# Patient Record
Sex: MALE | Race: BLACK OR AFRICAN AMERICAN | NOT HISPANIC OR LATINO | URBAN - METROPOLITAN AREA
[De-identification: names, ages, dates, MRNs, and addresses within clinical notes are randomized per-mention and may not be internally consistent; named-entity substitution may affect disease eponyms.]

---

## 2021-03-11 ENCOUNTER — OFFICE VISIT (OUTPATIENT)
Dept: FAMILY MEDICINE | Facility: CLINIC | Age: 51
End: 2021-03-11
Payer: COMMERCIAL

## 2021-03-11 VITALS
HEART RATE: 71 BPM | HEIGHT: 67 IN | DIASTOLIC BLOOD PRESSURE: 90 MMHG | BODY MASS INDEX: 27.47 KG/M2 | WEIGHT: 175 LBS | TEMPERATURE: 97.4 F | SYSTOLIC BLOOD PRESSURE: 132 MMHG | OXYGEN SATURATION: 95 %

## 2021-03-11 DIAGNOSIS — Z13.6 CARDIOVASCULAR SCREENING; LDL GOAL LESS THAN 130: ICD-10-CM

## 2021-03-11 DIAGNOSIS — K64.9 HEMORRHOIDS, UNSPECIFIED HEMORRHOID TYPE: ICD-10-CM

## 2021-03-11 DIAGNOSIS — K21.9 GASTROESOPHAGEAL REFLUX DISEASE WITHOUT ESOPHAGITIS: ICD-10-CM

## 2021-03-11 DIAGNOSIS — R07.89 ATYPICAL CHEST PAIN: Primary | ICD-10-CM

## 2021-03-11 DIAGNOSIS — I10 STAGE 1 HYPERTENSION: ICD-10-CM

## 2021-03-11 LAB
ANION GAP SERPL CALCULATED.3IONS-SCNC: 4 MMOL/L (ref 3–14)
BUN SERPL-MCNC: 13 MG/DL (ref 7–30)
CALCIUM SERPL-MCNC: 9.3 MG/DL (ref 8.5–10.1)
CHLORIDE SERPL-SCNC: 103 MMOL/L (ref 94–109)
CHOLEST SERPL-MCNC: 156 MG/DL
CO2 SERPL-SCNC: 30 MMOL/L (ref 20–32)
CREAT SERPL-MCNC: 0.97 MG/DL (ref 0.66–1.25)
CREAT UR-MCNC: 193 MG/DL
GFR SERPL CREATININE-BSD FRML MDRD: 90 ML/MIN/{1.73_M2}
GLUCOSE SERPL-MCNC: 79 MG/DL (ref 70–99)
HDLC SERPL-MCNC: 29 MG/DL
LDLC SERPL CALC-MCNC: 107 MG/DL
MICROALBUMIN UR-MCNC: 14 MG/L
MICROALBUMIN/CREAT UR: 7.31 MG/G CR (ref 0–17)
NONHDLC SERPL-MCNC: 127 MG/DL
POTASSIUM SERPL-SCNC: 4.1 MMOL/L (ref 3.4–5.3)
SODIUM SERPL-SCNC: 137 MMOL/L (ref 133–144)
TRIGL SERPL-MCNC: 99 MG/DL

## 2021-03-11 PROCEDURE — 36415 COLL VENOUS BLD VENIPUNCTURE: CPT | Performed by: INTERNAL MEDICINE

## 2021-03-11 PROCEDURE — 80048 BASIC METABOLIC PNL TOTAL CA: CPT | Performed by: INTERNAL MEDICINE

## 2021-03-11 PROCEDURE — 93000 ELECTROCARDIOGRAM COMPLETE: CPT | Performed by: INTERNAL MEDICINE

## 2021-03-11 PROCEDURE — 99204 OFFICE O/P NEW MOD 45 MIN: CPT | Performed by: INTERNAL MEDICINE

## 2021-03-11 PROCEDURE — 82043 UR ALBUMIN QUANTITATIVE: CPT | Performed by: INTERNAL MEDICINE

## 2021-03-11 PROCEDURE — 80061 LIPID PANEL: CPT | Performed by: INTERNAL MEDICINE

## 2021-03-11 RX ORDER — LANSOPRAZOLE 30 MG/1
30 CAPSULE, DELAYED RELEASE ORAL
Qty: 30 CAPSULE | Refills: 11 | Status: SHIPPED | OUTPATIENT
Start: 2021-03-11 | End: 2024-07-26

## 2021-03-11 SDOH — HEALTH STABILITY: MENTAL HEALTH: HOW MANY STANDARD DRINKS CONTAINING ALCOHOL DO YOU HAVE ON A TYPICAL DAY?: NOT ASKED

## 2021-03-11 SDOH — HEALTH STABILITY: MENTAL HEALTH: HOW OFTEN DO YOU HAVE A DRINK CONTAINING ALCOHOL?: NEVER

## 2021-03-11 SDOH — HEALTH STABILITY: MENTAL HEALTH: HOW OFTEN DO YOU HAVE 6 OR MORE DRINKS ON ONE OCCASION?: NEVER

## 2021-03-11 ASSESSMENT — MIFFLIN-ST. JEOR: SCORE: 1612.42

## 2021-03-11 NOTE — PROGRESS NOTES
Evelyn Leslie is a 50 year old who presents for the following health issues     Chest Pain  Onset/Duration: 1 year  Description:   Location: left side  Character: burning and heavy  Radiation: no  Duration: varies from minutes to hours/intermittent   Intensity: moderate  Progression of Symptoms: same  Accompanying Signs & Symptoms:  Shortness of breath: no  Sweating: no  Nausea/vomiting: no  Lightheadedness: no  Palpitations: no  Fever/Chills: no  Cough: no           Heartburn: YES  History:   Family history of heart disease: no  Tobacco use: no  Previous similar symptoms: no   Precipitating factors: While driving last year. This year, it occurs while eating. Patient states that he gained weight, about 20-25 pounds. He also feels tired when he wakes up and snores heavily, leading to difficulty sleeping (2-3 hours sleep latency).  Worse with exertion: no-only after eating  Worse with deep breaths: no           Related to eating: YES- after           Better with burping: no  Alleviating factors: None,  Therapies tried and outcome: none.  Family hx is significant for hypertension (father and siblings) and stroke (father, at age 75).      Review of Systems   CONSTITUTIONAL: NEGATIVE for fever, chills, change in weight  INTEGUMENTARY/SKIN: NEGATIVE for worrisome rashes, moles or lesions  EYES: NEGATIVE for vision changes or irritation  ENT/MOUTH: NEGATIVE for ear, mouth and throat problems  RESP: NEGATIVE for significant cough or SOB  CV: NEGATIVE for diaphoresis, dyspnea on exertion, irregular heart beat, lower extremity edema, orthopnea, palpitations and paroxysmal nocturnal dyspnea  GI: NEGATIVE for nausea, abdominal pain, heartburn, or change in bowel habits  : NEGATIVE for frequency, dysuria, or hematuria  MUSCULOSKELETAL: NEGATIVE for significant arthralgias or myalgia  NEURO: NEGATIVE for weakness, dizziness or paresthesias  ENDOCRINE: NEGATIVE for temperature intolerance, skin/hair changes  HEME:  "NEGATIVE for bleeding problems  PSYCHIATRIC: NEGATIVE for changes in mood or affect      Objective    BP (!) 132/90 (BP Location: Left arm, Patient Position: Supine, Cuff Size: Adult Regular)   Pulse 71   Temp 97.4  F (36.3  C) (Tympanic)   Ht 1.702 m (5' 7\")   Wt 79.4 kg (175 lb)   SpO2 95%   BMI 27.41 kg/m    Body mass index is 27.41 kg/m .  Physical Exam   GENERAL: healthy, alert and no distress  EYES: Eyes grossly normal to inspection and EOMI  HENT: normal cephalic/atraumatic and oral mucous membranes moist  NECK: no adenopathy and thyroid normal to palpation  RESP: lungs clear to auscultation - no rales, rhonchi or wheezes  CV: regular rate and rhythm, normal S1 S2, no S3 or S4, no murmur, click or rub, no peripheral edema and peripheral pulses strong  ABDOMEN: soft, nontender, no hepatosplenomegaly, no masses and bowel sounds normal  RECTAL Deferred.  MS: no gross musculoskeletal defects noted, no edema  NEURO: Normal strength and tone, mentation intact and speech normal  PSYCH: mentation appears normal, affect normal/bright    ASSESSMENT/PLAN  1. Atypical chest pain  Non-cardiac since EKG shows normal sinus rhythm.  - EKG 12-lead complete w/read - Clinics    2. Stage 1 hypertension  Patient prefers conservative measures and dfer medical treatment.  - Basic metabolic panel  (Ca, Cl, CO2, Creat, Gluc, K, Na, BUN)  - Albumin Random Urine Quantitative with Creat Ratio    3. Gastroesophageal reflux disease without esophagitis  Most probable cause of patient's noncardiac chest pain.  - LANsoprazole (PREVACID) 30 MG DR capsule; Take 1 capsule (30 mg) by mouth every morning (before breakfast)  Dispense: 30 capsule; Refill: 11    4. CARDIOVASCULAR SCREENING; LDL GOAL LESS THAN 130  Once gain, patient's progressive weight and increasing sedentary lifestyle contributed to low HDL.  Dietary changes emphasized.  - Lipid panel reflex to direct LDL Fasting    5. Hemorrhoids, unspecified hemorrhoid type  Probable " cause of painless rectal bleeding. But patient is also due for colonoscopy. Defer to next clinic visit.    Disposition: Follow-up in 3 - 6 months.    Junior Dangelo MD  Internal Medicine

## 2021-03-11 NOTE — LETTER
"2021      Mariama Larios  [address]  [place] [address]        Dear ,    Your recent laboratory tests show the followin) Comprehensive metabolic panel shows normal glucose level (not diabetic), normal electrolytes (consisting of potassium, chloride, sodium and calcium), normal hepatic/liver panel and normal renal/kidney function tests.   2) Lipid panel is mildly abnormal. Low HDL (\"good cholesterol\") indicates that you need to exercise more and increase fiber intake. High LDL or \"bad cholesterol\" is high. Based on your test, your estimated risk of heart disease/stroke is only 6.1%. Therefore, no reason to start statins such as Lipitor or even aspirin. Your atypical chest pain is most likely due to heartburn, as discussed during your visit.   3) Basic  metabolic panel shows normal glucose level (not diabetic) and normal electrolytes (consisting of potassium, chloride, sodium and calcium) and normal kidney panel.   For any questions, you may call my office at [phone].     Sincerely,       Junior Dangelo MD   Internal Medicine     Resulted Orders   Lipid panel reflex to direct LDL Fasting   Result Value Ref Range    Cholesterol 156 <200 mg/dL    Triglycerides 99 <150 mg/dL    HDL Cholesterol 29 (L) >39 mg/dL    LDL Cholesterol Calculated 107 (H) <100 mg/dL      Comment:      Above desirable:  100-129 mg/dl  Borderline High:  130-159 mg/dL  High:             160-189 mg/dL  Very high:       >189 mg/dl      Non HDL Cholesterol 127 <130 mg/dL   Basic metabolic panel  (Ca, Cl, CO2, Creat, Gluc, K, Na, BUN)   Result Value Ref Range    Sodium 137 133 - 144 mmol/L    Potassium 4.1 3.4 - 5.3 mmol/L    Chloride 103 94 - 109 mmol/L    Carbon Dioxide 30 20 - 32 mmol/L    Anion Gap 4 3 - 14 mmol/L    Glucose 79 70 - 99 mg/dL    Urea Nitrogen 13 7 - 30 mg/dL    Creatinine 0.97 0.66 - 1.25 mg/dL    GFR Estimate 90 >60 mL/min/[1.73_m2]      Comment:      Non  GFR " Calc  Starting 12/18/2018, serum creatinine based estimated GFR (eGFR) will be   calculated using the Chronic Kidney Disease Epidemiology Collaboration   (CKD-EPI) equation.      GFR Estimate If Black >90 >60 mL/min/[1.73_m2]      Comment:       GFR Calc  Starting 12/18/2018, serum creatinine based estimated GFR (eGFR) will be   calculated using the Chronic Kidney Disease Epidemiology Collaboration   (CKD-EPI) equation.      Calcium 9.3 8.5 - 10.1 mg/dL   Albumin Random Urine Quantitative with Creat Ratio   Result Value Ref Range    Creatinine Urine 193 mg/dL    Albumin Urine mg/L 14 mg/L    Albumin Urine mg/g Cr 7.31 0 - 17 mg/g Cr

## 2021-03-11 NOTE — PATIENT INSTRUCTIONS
At [facility], we strive to deliver an exceptional experience to you, every time we see you. If you receive a survey, please complete it as we do value your feedback.  If you have MyChart, you can expect to receive results automatically within 24 hours of their completion.  Your provider will send a note interpreting your results as well.   If you do not have MyChart, you should receive your results in about a week by mail.    Your care team:                            Family Medicine Internal Medicine   MD Junior San MD Shantel Branch-Fleming, MD Srinivasa Vaka, MD Katya Belousova, PATERA Rizzo, APRN CNP    Ba Bennett, MD Pediatrics   Sebastien Castillo, PATERA Giles, CNP MD Ashlyn Coello APRN CNP   MD Corinne Davis MD Deborah Mielke, MD Rocío Anderson, APRN [facility]      Clinic hours: Monday - Thursday 7 am-6 pm; Fridays 7 am-5 pm.   Urgent care: Monday - Friday 11 am-9 pm; Saturday and Sunday 9 am-5 pm.    Clinic: [phone]       [facility]: Monday - Thursday 8 am - 7 pm; Friday 8 am - 6 pm  [facility]: [phone]     Use [email or web address] for 24/7 diagnosis and treatment of dozens of conditions.

## 2024-01-31 ENCOUNTER — OFFICE VISIT (OUTPATIENT)
Dept: URGENT CARE | Facility: URGENT CARE | Age: 54
End: 2024-01-31
Payer: COMMERCIAL

## 2024-01-31 VITALS
DIASTOLIC BLOOD PRESSURE: 91 MMHG | BODY MASS INDEX: 28.51 KG/M2 | OXYGEN SATURATION: 98 % | HEART RATE: 93 BPM | RESPIRATION RATE: 16 BRPM | TEMPERATURE: 97.6 F | SYSTOLIC BLOOD PRESSURE: 152 MMHG | WEIGHT: 182 LBS

## 2024-01-31 DIAGNOSIS — I10 STAGE 1 HYPERTENSION: ICD-10-CM

## 2024-01-31 DIAGNOSIS — M72.2 PLANTAR FASCIITIS OF RIGHT FOOT: Primary | ICD-10-CM

## 2024-01-31 PROCEDURE — 99203 OFFICE O/P NEW LOW 30 MIN: CPT | Performed by: PHYSICIAN ASSISTANT

## 2024-01-31 ASSESSMENT — PAIN SCALES - GENERAL: PAINLEVEL: SEVERE PAIN (7)

## 2024-01-31 NOTE — PROGRESS NOTES
Chief Complaint   Patient presents with    Musculoskeletal Problem     Pain on right heel for about 3 weeks. Hurt when walking, no injury.       ASSESSMENT/PLAN:  Mariama was seen today for musculoskeletal problem.    Diagnoses and all orders for this visit:    Plantar fasciitis of right foot  -     Orthopedic  Referral; Future    Stage 1 hypertension  -     Primary Care Referral; Future    Stretch before any first step after rest, use of NSAIDs for inflammation, night splints and freezing a water bottle and rolling it out with the affected foot to improve symptoms and healing.  Follow-up with Ortho if not improving over the next 3 to 4 weeks    Patient wanting to schedule an annual exam with his PCP.  They can go over his high blood pressure and other chronic medical conditions at that time.  Referral placed    Hayden Thao PA-C      SUBJECTIVE:  Mariama is a 53 year old male who presents to urgent care with 3 weeks of pain in the right heel.  It is worse when he gets out of bed in the morning or if he rests for a while and takes a step.  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  Does seem to get better a little as he continues to walk.  Feels otherwise well.  No acute injuries or trauma.    ROS: Pertinent ROS neg other than the symptoms noted above in the HPI.     OBJECTIVE:  BP (!) 152/91 (BP Location: Left arm, Patient Position: Sitting, Cuff Size: Adult Large)   Pulse 93   Temp 97.6  F (36.4  C) (Tympanic)   Resp 16   Wt 82.6 kg (182 lb)   SpO2 98%   BMI 28.51 kg/m     GENERAL: alert and no distress  MS: no gross musculoskeletal defects noted, no edema, tender at the calcaneal insertion of the plantar fascia.  No other bony tenderness.  Full range of motion.  SKIN: no suspicious lesions or rashes  NEURO: Normal strength and tone, mentation intact and speech normal    DIAGNOSTICS    No results found for any visits on 01/31/24.      Current Outpatient Medications   Medication    LANsoprazole (PREVACID) 30 MG DR capsule     No current facility-administered medications for this visit.      Patient Active Problem List   Diagnosis    Gastroesophageal reflux disease without esophagitis    Stage 1 hypertension    Hemorrhoids, unspecified hemorrhoid type    CARDIOVASCULAR SCREENING; LDL GOAL LESS THAN 130      No past medical history on file.  No past surgical history on file.  Family History   Problem Relation Age of Onset    Hypertension Father     Other - See Comments Father         stroke at 75    Hypertension Brother     Hypertension Brother      Social History     Tobacco Use    Smoking status: Never    Smokeless tobacco: Never   Substance Use Topics    Alcohol use: Never              The plan of care was discussed with the patient. They understand and agree with the course of treatment prescribed. A printed summary was given including instructions and medications.  The use of Dragon/VideoIQ dictation services may have been used to construct the content in this note; any grammatical or spelling errors are non-intentional. Please contact the author of this note directly if you are in need of any clarification.

## 2024-01-31 NOTE — PATIENT INSTRUCTIONS
Stretch before any first step after rest, use of NSAIDs for inflammation, night splints and freezing a water bottle and rolling it out with the affected foot to improve symptoms and healing.

## 2024-04-25 ENCOUNTER — OFFICE VISIT (OUTPATIENT)
Dept: URGENT CARE | Facility: URGENT CARE | Age: 54
End: 2024-04-25
Payer: COMMERCIAL

## 2024-04-25 VITALS
TEMPERATURE: 96.8 F | WEIGHT: 182.3 LBS | OXYGEN SATURATION: 98 % | BODY MASS INDEX: 28.55 KG/M2 | HEART RATE: 94 BPM | SYSTOLIC BLOOD PRESSURE: 139 MMHG | DIASTOLIC BLOOD PRESSURE: 96 MMHG

## 2024-04-25 DIAGNOSIS — M72.2 PLANTAR FASCIITIS OF RIGHT FOOT: Primary | ICD-10-CM

## 2024-04-25 PROCEDURE — 99213 OFFICE O/P EST LOW 20 MIN: CPT | Performed by: PHYSICIAN ASSISTANT

## 2024-04-25 RX ORDER — NAPROXEN 500 MG/1
500 TABLET ORAL 2 TIMES DAILY PRN
Qty: 30 TABLET | Refills: 0 | Status: SHIPPED | OUTPATIENT
Start: 2024-04-25 | End: 2024-05-10

## 2024-04-25 ASSESSMENT — ENCOUNTER SYMPTOMS
NECK PAIN: 0
MYALGIAS: 1
JOINT SWELLING: 0
BACK PAIN: 0
PALPITATIONS: 0
FATIGUE: 0
FEVER: 0
COLOR CHANGE: 0
CHILLS: 0
CARDIOVASCULAR NEGATIVE: 1
ARTHRALGIAS: 1
NUMBNESS: 0
NECK STIFFNESS: 0
WOUND: 0

## 2024-04-25 NOTE — PROGRESS NOTES
Subjective   Mariama is a 53 year old, presenting for the following health issues:  Urgent Care and Leg Pain (Right leg pain-Couple months ago told it was a tear of the tissue, the pain refused to go)    HPI   Musculoskeletal problem/pain  Onset/Duration: 2months  Description  Location: R foot  Joint Swelling: no  Redness: no  Pain: YES  Warmth: no  Intensity:  moderate  Progression of Symptoms:  same  Accompanying signs and symptoms:   Fevers: no  Numbness/tingling/weakness: no  History  Trauma to the area: no but reports pain under his foot and heel region.  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   Recent illness:  no  Previous similar problem: no  Previous evaluation:  no  Precipitating or alleviating factors:  Aggravating factors include: overuse  Therapies tried and outcome: rest/inactivity, acetaminophen, Ibuprofen, massage ball with minimal relief    Patient Active Problem List   Diagnosis    Gastroesophageal reflux disease without esophagitis    Stage 1 hypertension    Hemorrhoids, unspecified hemorrhoid type    CARDIOVASCULAR SCREENING; LDL GOAL LESS THAN 130     Current Outpatient Medications   Medication Sig Dispense Refill    LANsoprazole (PREVACID) 30 MG DR capsule Take 1 capsule (30 mg) by mouth every morning (before breakfast) (Patient not taking: Reported on 1/31/2024) 30 capsule 11     No current facility-administered medications for this visit.      No Known Allergies    Review of Systems   Constitutional:  Negative for chills, fatigue and fever.   Cardiovascular: Negative.  Negative for chest pain, palpitations and leg swelling.   Musculoskeletal:  Positive for arthralgias and myalgias. Negative for back pain, gait problem, joint swelling, neck pain and neck stiffness.   Skin:  Negative for color change, pallor, rash and wound.   Neurological:  Negative for numbness.   All other systems reviewed and are negative.          Objective     BP (!) 139/96 (BP Location: Left arm, Patient Position: Sitting, Cuff Size: Adult Regular)   Pulse 94   Temp 96.8  F (36  C) (Tympanic)   Wt 82.7 kg (182 lb 4.8 oz)   SpO2 98%   BMI 28.55 kg/m    Body mass index is 28.55 kg/m .  Physical Exam  Vitals and nursing note reviewed.   Constitutional:       General: He is not in acute distress.     Appearance: Normal appearance. He is normal weight. He is not ill-appearing.   Musculoskeletal:      Right foot: Normal range of motion and normal capillary refill. Tenderness (heel region) present. No swelling, deformity, bony tenderness or crepitus. Normal pulse.      Left foot: Normal. Normal capillary refill. Normal pulse.   Skin:     General: Skin is warm.      Capillary Refill: Capillary refill takes less than 2 seconds.   Neurological:      Mental Status: He is alert and oriented to person, place, and time.   Psychiatric:         Mood and Affect: Mood normal.         Behavior: Behavior normal.         Thought Content: Thought content normal.         Judgment: Judgment normal.             Assessment/Plan:  Plantar fasciitis of right foot:   He has failed conservative treatment. Recommend RICE, wear comfortable shoes and will give naproxen prn pain.  Will also send to orthopedics for further evaluation and management.  Recheck in clinic if symptoms worsen or if symptoms do not improve.   -     Orthopedic  Referral  -     naproxen (NAPROSYN) 500 MG tablet; Take 1 tablet (500 mg) by mouth 2 times daily as needed for moderate pain (with food)        Ann-Marie Curran PA-C

## 2024-04-25 NOTE — LETTER
April 25, 2024      Mariama Larios  [address] [facility] [address]        To Whom It May Concern:    Mariama Larios was seen in urgent care clinic today and was unable to work today due to his symptoms.  Please feel free to contact me via phone if you have any questions or concerns.        Sincerely,      Ann-Marie Curran PA-C

## 2024-05-15 ENCOUNTER — ANCILLARY PROCEDURE (OUTPATIENT)
Dept: GENERAL RADIOLOGY | Facility: CLINIC | Age: 54
End: 2024-05-15
Attending: FAMILY MEDICINE
Payer: COMMERCIAL

## 2024-05-15 ENCOUNTER — OFFICE VISIT (OUTPATIENT)
Dept: ORTHOPEDICS | Facility: CLINIC | Age: 54
End: 2024-05-15
Payer: COMMERCIAL

## 2024-05-15 VITALS
BODY MASS INDEX: 28.56 KG/M2 | SYSTOLIC BLOOD PRESSURE: 138 MMHG | HEIGHT: 67 IN | DIASTOLIC BLOOD PRESSURE: 88 MMHG | WEIGHT: 182 LBS

## 2024-05-15 DIAGNOSIS — M25.571 PAIN IN JOINT INVOLVING RIGHT ANKLE AND FOOT: ICD-10-CM

## 2024-05-15 DIAGNOSIS — M25.571 PAIN IN JOINT INVOLVING RIGHT ANKLE AND FOOT: Primary | ICD-10-CM

## 2024-05-15 DIAGNOSIS — M72.2 PLANTAR FASCIITIS OF RIGHT FOOT: ICD-10-CM

## 2024-05-15 PROCEDURE — 73630 X-RAY EXAM OF FOOT: CPT | Mod: TC | Performed by: RADIOLOGY

## 2024-05-15 PROCEDURE — 99204 OFFICE O/P NEW MOD 45 MIN: CPT | Performed by: FAMILY MEDICINE

## 2024-05-15 NOTE — LETTER
"    5/15/2024         RE: Mariama Larios  [address] [place] [address]        Dear Colleague,    Thank you for referring your patient, Mariama Larios, to the [facility]. Please see a copy of my visit note below.    Mariama Larios  :  1970  DOS: 5/15/2024  MRN: [id number]    Sports Medicine Clinic Visit    PCP: Junior Dangelo    Mariama Larios is a 53 year old male who is seen in consultation at the request of  Ann-Marie Curran PA-C presenting with right foot pain.    Injury: Gradual onset of pain over the past 4+ months.  Pain located over right plantar heel, medial arch, radiating to dorsal forefoot.  Additional Features:  Positive: swelling and antalgic gait.  Symptoms are better with Ice and Aleve.  Symptoms are worse with: initial steps in AM & after prolonged sitting, prolonged walking/standing.  Other evaluation and/or treatments so far consists of: Ice, Aleve, Rest, and self massage, stretching.  Prior imaging: No recent imaging completed.  Prior History of related problems: none    Social History: currently employed as [employer]     Review of Systems  Musculoskeletal: as above  Remainder of review of systems is negative including constitutional, CV, pulmonary, GI, Skin and Neurologic except as noted in HPI or medical history.    No past medical history on file.  No past surgical history on file.  Family History   Problem Relation Age of Onset     Hypertension Father      Other - See Comments Father         stroke at 75     Hypertension Brother      Hypertension Brother        Objective  /88   Ht 1.702 m (5' 7\")   Wt 82.6 kg (182 lb)   BMI 28.51 kg/m      General: healthy, alert and in no distress    HEENT: no scleral icterus or conjunctival erythema   Skin: no suspicious lesions or rash. No jaundice.   CV: regular rhythm by palpation, 2+ distal pulses, no pedal edema    Resp: normal respiratory effort " without conversational dyspnea   Psych: normal mood and affect    Gait: antalgic, appropriate coordination and balance   Neuro: normal light touch sensory exam of the extremities. Motor strength as noted below     Right Ankle/Foot Exam:    Inspection:       No visible ecchymosis       No visible edema or effusion       Normal DP artery pulse       Normal PT artery pulse    Foot inspection:       no deformity noted    ROM:        full ROM with dorsiflexion, plantarflexion, inversion and eversion    Tender:       Most focal over the plantar fascia proximally at attachment to calcaneus       Mild 1st MTP joint TTP       Mild moderate TTP of the medial and lateral gastrocs    Non-Tender:       remainder of foot and ankle       lateral malleolus       lateral ankle ligaments       deltoid ligament       posterior edge of lateral malleolus       proximal 5th metatarsal       dorsal tibiotalar joint       tarsal navicular       medial malleolus       distal tibiofibular joint       tibialis posterior tendon, posterior to medial malleolus       peroneal tendon sheath    Skin:       well perfused       capillary refill less than 2 seconds    Special Tests:       neg (-) anterior drawer        neg (-) talar tilt        neg (-) external rotation testing     Gait:       antalgic gait    Proprioception:        deferred      Radiology:  Recent Results (from the past 744 hour(s))   XR Foot Right G/E 3 Views    Narrative    XR FOOT RIGHT G/E 3 VIEWS 5/15/2024 8:53 AM    HISTORY: Pain in joint involving right ankle and foot    COMPARISON: None.      Impression    IMPRESSION: No fracture or degenerative changes. Normal alignment.    ERICA OROZCO MD         SYSTEM ID:  [id number]       Assessment:  1. Pain in joint involving right ankle and foot    2. Plantar fasciitis of right foot        Plan:  Discussed the assessment with the patient.  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  Letter for work provided to consider alternatives in the short term   HEP provided, PT offered and available in the future if desired  XR images independently visualized and reviewed with patient today in clinic  Oral Tylenol and topical Voltaren gel reviewed as safe OTC options, reviewed safe dosing strategies  Heel cup options reviewed  No concern for stress fracture or joint pain based on exam, very mild 1st MTP joint pain related to mild DJD  Footwear options and strategies reviewed  Consider dynaflex insert or walking boot if pain worsens  Prefer to avoid CSI for this issue, could consider PRP vs percutaneous tenotomy in the future if sx worsen or do not improve  We discussed modified progressive pain-free activity as tolerated  Home handouts provided and supportive care reviewed  All questions were answered today  Contact us with additional questions or concerns  Signs and sx of concern reviewed      Bryce Lester DO, ROSA MARIA  Sports Medicine Physician  [facility]              Again, thank you for allowing me to participate in the care of your patient.        Sincerely,        Bryce Lester DO

## 2024-05-15 NOTE — PROGRESS NOTES
"Mariama Larios  :  1970  DOS: 5/15/2024  MRN: [id number]    Sports Medicine Clinic Visit    PCP: Junior Dangelo    Mariama Larios is a 53 year old male who is seen in consultation at the request of  Ann-Marie Curran PA-C presenting with right foot pain.    Injury: Gradual onset of pain over the past 4+ months.  Pain located over right plantar heel, medial arch, radiating to dorsal forefoot.  Additional Features:  Positive: swelling and antalgic gait.  Symptoms are better with Ice and Aleve.  Symptoms are worse with: initial steps in AM & after prolonged sitting, prolonged walking/standing.  Other evaluation and/or treatments so far consists of: Ice, Aleve, Rest, and self massage, stretching.  Prior imaging: No recent imaging completed.  Prior History of related problems: none    Social History: currently employed as [employer]     Review of Systems  Musculoskeletal: as above  Remainder of review of systems is negative including constitutional, CV, pulmonary, GI, Skin and Neurologic except as noted in HPI or medical history.    No past medical history on file.  No past surgical history on file.  Family History   Problem Relation Age of Onset    Hypertension Father     Other - See Comments Father         stroke at 75    Hypertension Brother     Hypertension Brother        Objective  /88   Ht 1.702 m (5' 7\")   Wt 82.6 kg (182 lb)   BMI 28.51 kg/m      General: healthy, alert and in no distress    HEENT: no scleral icterus or conjunctival erythema   Skin: no suspicious lesions or rash. No jaundice.   CV: regular rhythm by palpation, 2+ distal pulses, no pedal edema    Resp: normal respiratory effort without conversational dyspnea   Psych: normal mood and affect    Gait: antalgic, appropriate coordination and balance   Neuro: normal light touch sensory exam of the extremities. Motor strength as noted below     Right Ankle/Foot Exam:    Inspection:       No visible " ecchymosis       No visible edema or effusion       Normal DP artery pulse       Normal PT artery pulse    Foot inspection:       no deformity noted    ROM:        full ROM with dorsiflexion, plantarflexion, inversion and eversion    Tender:       Most focal over the plantar fascia proximally at attachment to calcaneus       Mild 1st MTP joint TTP       Mild moderate TTP of the medial and lateral gastrocs    Non-Tender:       remainder of foot and ankle       lateral malleolus       lateral ankle ligaments       deltoid ligament       posterior edge of lateral malleolus       proximal 5th metatarsal       dorsal tibiotalar joint       tarsal navicular       medial malleolus       distal tibiofibular joint       tibialis posterior tendon, posterior to medial malleolus       peroneal tendon sheath    Skin:       well perfused       capillary refill less than 2 seconds    Special Tests:       neg (-) anterior drawer        neg (-) talar tilt        neg (-) external rotation testing     Gait:       antalgic gait    Proprioception:        deferred      Radiology:  Recent Results (from the past 744 hour(s))   XR Foot Right G/E 3 Views    Narrative    XR FOOT RIGHT G/E 3 VIEWS 5/15/2024 8:53 AM    HISTORY: Pain in joint involving right ankle and foot    COMPARISON: None.      Impression    IMPRESSION: No fracture or degenerative changes. Normal alignment.    ERICA OROZCO MD         SYSTEM ID:  [id number]       Assessment:  1. Pain in joint involving right ankle and foot    2. Plantar fasciitis of right foot        Plan:  Discussed the assessment with the patient.  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  Letter for work provided to consider alternatives in the  short term   HEP provided, PT offered and available in the future if desired  XR images independently visualized and reviewed with patient today in clinic  Oral Tylenol and topical Voltaren gel reviewed as safe OTC options, reviewed safe dosing strategies  Heel cup options reviewed  No concern for stress fracture or joint pain based on exam, very mild 1st MTP joint pain related to mild DJD  Footwear options and strategies reviewed  Consider dynaflex insert or walking boot if pain worsens  Prefer to avoid CSI for this issue, could consider PRP vs percutaneous tenotomy in the future if sx worsen or do not improve  We discussed modified progressive pain-free activity as tolerated  Home handouts provided and supportive care reviewed  All questions were answered today  Contact us with additional questions or concerns  Signs and sx of concern reviewed      Bryce Lester DO, CAQ  Sports Medicine Physician  [facility]

## 2024-05-15 NOTE — LETTER
May 15, 2024      Mariama was seen in my office today and is under my care.  He has significant ongoing pain that requires activity and work modification.  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  Larger vehicles utilize different braking and acceleration systems that increase the stress on his condition.  Failure to adhere to these recommendations will lead to increased pain and potentially missed work.  Any absence if needed over the next 3 months due to a severe flare of pain should be considered medically excused if needed.  Updated recommendations will be provided as needed based on his progress.      Bryce Sarkar DO, CABJ  Sports Medicine Physician  [facility]

## 2024-05-16 ENCOUNTER — TELEPHONE (OUTPATIENT)
Dept: ORTHOPEDICS | Facility: CLINIC | Age: 54
End: 2024-05-16
Payer: COMMERCIAL

## 2024-05-16 NOTE — TELEPHONE ENCOUNTER
[facility] Call Center    Phone Message    May a detailed message be left on voicemail: yes     Reason for Call: Other: Patient is calling to speak to care team regarding appt yesterday and letter that he received as well      Action Taken: Other: repa    Travel Screening: Not Applicable

## 2024-05-16 NOTE — TELEPHONE ENCOUNTER
LVM for return call to discuss his workability letter and specific questions that he may have.  Please gather more specific information when patient returns phone call.    Carlos Rosenthal ATC

## 2024-05-16 NOTE — TELEPHONE ENCOUNTER
Other: pt called in regards to work note. There is a letter from employment that is needing provider to sign off. Pt will be dropping off the letter of employment today and will be back tomorrow to  the letter. Can care team reach out to pt once the letter has been signed off for pt to ?      [facility] or would you prefer to receive a phone call?:   Patient would prefer a phone call   Okay to leave a detailed message?: Yes at Work number on file:  [phone] (work)

## 2024-05-17 ENCOUNTER — TELEPHONE (OUTPATIENT)
Dept: ORTHOPEDICS | Facility: CLINIC | Age: 54
End: 2024-05-17
Payer: COMMERCIAL

## 2024-05-17 NOTE — TELEPHONE ENCOUNTER
Reason for Call:  Form, our goal is to have forms completed with 7 days, however, some forms may require a visit or additional information.    Type of letter, form or note:   Return to Work      Who is the form from?:  Employer - [employer]    Where did the form come from: Patient or family brought in       Phone number of person requesting form:   Can we leave a detailed message on this number:      Desired completion date of form: ASAP      How will form be returned?:  picked up in clinic by patient, clinical team to contact when ready    Has the patient signed a consent form for release of information (may be included with form)?  No -verbal consent in prior message    Additional comments:     Form was started and place in Provider Basket for provider review/ completion at [facility].     Carlos Rosenthal ATC

## 2024-05-17 NOTE — TELEPHONE ENCOUNTER
Patient called in wanting to speak with Dr. Lester directly.. I advised him that he was in patient care and I would reach out to his team to see if someone was available to speak with him. He will not tell me what it is in regards too. Only thing he asked about when he called in at 1st was his paperwork and if it was ready, I advised him that it was not and Dr. Lester's team has 5-7 days to complete it - when it is done they would reach out to him.       Please contact the patient back at [phone].     Thank you,   Bri Dawson - [facility]

## 2024-05-17 NOTE — TELEPHONE ENCOUNTER
FELIXM for return call to discuss that forms had been completed & placed at [facility]  for .  Copy of completed form was sent to scanning.    Carlos Rosenthal ATC

## 2024-05-17 NOTE — TELEPHONE ENCOUNTER
Patient called in wanting to speak with Dr. Lester directly.. I advised him that he was in patient care and I would reach out to his team to see if someone was available to speak with him. He will not tell me what it is in regards too. Only thing he asked about when he called in at 1st was his paperwork and if it was ready, I advised him that it was not and Dr. Lester's team has 5-7 days to complete it - when it is done they would reach out to him.      Please contact the patient back at [phone].    Thank you,   Bri Dawson - [facility]

## 2024-07-26 ENCOUNTER — OFFICE VISIT (OUTPATIENT)
Dept: FAMILY MEDICINE | Facility: CLINIC | Age: 54
End: 2024-07-26
Payer: COMMERCIAL

## 2024-07-26 VITALS
DIASTOLIC BLOOD PRESSURE: 86 MMHG | WEIGHT: 177.6 LBS | SYSTOLIC BLOOD PRESSURE: 134 MMHG | RESPIRATION RATE: 16 BRPM | HEIGHT: 67 IN | HEART RATE: 79 BPM | BODY MASS INDEX: 27.88 KG/M2 | TEMPERATURE: 98 F | OXYGEN SATURATION: 97 %

## 2024-07-26 DIAGNOSIS — Z31.69 INFERTILITY COUNSELING: ICD-10-CM

## 2024-07-26 DIAGNOSIS — Z12.11 SCREEN FOR COLON CANCER: Primary | ICD-10-CM

## 2024-07-26 DIAGNOSIS — Z00.00 ROUTINE GENERAL MEDICAL EXAMINATION AT A HEALTH CARE FACILITY: ICD-10-CM

## 2024-07-26 DIAGNOSIS — Z11.59 NEED FOR HEPATITIS C SCREENING TEST: ICD-10-CM

## 2024-07-26 DIAGNOSIS — Z11.4 SCREENING FOR HIV (HUMAN IMMUNODEFICIENCY VIRUS): ICD-10-CM

## 2024-07-26 LAB
ERYTHROCYTE [DISTWIDTH] IN BLOOD BY AUTOMATED COUNT: 14.5 % (ref 10–15)
HBA1C MFR BLD: 5.5 % (ref 0–5.6)
HCT VFR BLD AUTO: 46.6 % (ref 40–53)
HCV AB SERPL QL IA: NONREACTIVE
HGB BLD-MCNC: 15.4 G/DL (ref 13.3–17.7)
MCH RBC QN AUTO: 26.4 PG (ref 26.5–33)
MCHC RBC AUTO-ENTMCNC: 33 G/DL (ref 31.5–36.5)
MCV RBC AUTO: 80 FL (ref 78–100)
PLATELET # BLD AUTO: 279 10E3/UL (ref 150–450)
RBC # BLD AUTO: 5.83 10E6/UL (ref 4.4–5.9)
WBC # BLD AUTO: 4.2 10E3/UL (ref 4–11)

## 2024-07-26 PROCEDURE — 86803 HEPATITIS C AB TEST: CPT | Performed by: NURSE PRACTITIONER

## 2024-07-26 PROCEDURE — 80061 LIPID PANEL: CPT | Performed by: NURSE PRACTITIONER

## 2024-07-26 PROCEDURE — 87389 HIV-1 AG W/HIV-1&-2 AB AG IA: CPT | Performed by: NURSE PRACTITIONER

## 2024-07-26 PROCEDURE — 83036 HEMOGLOBIN GLYCOSYLATED A1C: CPT | Performed by: NURSE PRACTITIONER

## 2024-07-26 PROCEDURE — 91320 SARSCV2 VAC 30MCG TRS-SUC IM: CPT | Performed by: NURSE PRACTITIONER

## 2024-07-26 PROCEDURE — 90471 IMMUNIZATION ADMIN: CPT | Performed by: NURSE PRACTITIONER

## 2024-07-26 PROCEDURE — 90750 HZV VACC RECOMBINANT IM: CPT | Performed by: NURSE PRACTITIONER

## 2024-07-26 PROCEDURE — 36415 COLL VENOUS BLD VENIPUNCTURE: CPT | Performed by: NURSE PRACTITIONER

## 2024-07-26 PROCEDURE — 90480 ADMN SARSCOV2 VAC 1/ONLY CMP: CPT | Performed by: NURSE PRACTITIONER

## 2024-07-26 PROCEDURE — 80048 BASIC METABOLIC PNL TOTAL CA: CPT | Performed by: NURSE PRACTITIONER

## 2024-07-26 PROCEDURE — 85027 COMPLETE CBC AUTOMATED: CPT | Performed by: NURSE PRACTITIONER

## 2024-07-26 PROCEDURE — 99396 PREV VISIT EST AGE 40-64: CPT | Mod: 25 | Performed by: NURSE PRACTITIONER

## 2024-07-26 PROCEDURE — G0103 PSA SCREENING: HCPCS | Performed by: NURSE PRACTITIONER

## 2024-07-26 SDOH — HEALTH STABILITY: PHYSICAL HEALTH
ON AVERAGE, HOW MANY DAYS PER WEEK DO YOU ENGAGE IN MODERATE TO STRENUOUS EXERCISE (LIKE A BRISK WALK)?: PATIENT DECLINED

## 2024-07-26 SDOH — HEALTH STABILITY: PHYSICAL HEALTH: ON AVERAGE, HOW MANY MINUTES DO YOU ENGAGE IN EXERCISE AT THIS LEVEL?: 50 MIN

## 2024-07-26 ASSESSMENT — SOCIAL DETERMINANTS OF HEALTH (SDOH): HOW OFTEN DO YOU GET TOGETHER WITH FRIENDS OR RELATIVES?: MORE THAN THREE TIMES A WEEK

## 2024-07-26 NOTE — PATIENT INSTRUCTIONS
Patient Education   Preventive Care Advice   This is general advice given by our system to help you stay healthy. However, your care team may have specific advice just for you. Please talk to your care team about your preventive care needs.  Nutrition  Eat 5 or more servings of fruits and vegetables each day.  Try wheat bread, brown rice and whole grain pasta (instead of white bread, rice, and pasta).  Get enough calcium and vitamin D. Check the label on foods and aim for 100% of the RDA (recommended daily allowance).  Lifestyle  Exercise at least 150 minutes each week  (30 minutes a day, 5 days a week).  Do muscle strengthening activities 2 days a week. These help control your weight and prevent disease.  No smoking.  Wear sunscreen to prevent skin cancer.  Have a dental exam and cleaning every 6 months.  Yearly exams  See your health care team every year to talk about:  Any changes in your health.  Any medicines your care team has prescribed.  Preventive care, family planning, and ways to prevent chronic diseases.  Shots (vaccines)   HPV shots (up to age 26), if you've never had them before.  Hepatitis B shots (up to age 59), if you've never had them before.  COVID-19 shot: Get this shot when it's due.  Flu shot: Get a flu shot every year.  Tetanus shot: Get a tetanus shot every 10 years.  Pneumococcal, hepatitis A, and RSV shots: Ask your care team if you need these based on your risk.  Shingles shot (for age 50 and up)  General health tests  Diabetes screening:  Starting at age 35, Get screened for diabetes at least every 3 years.  If you are younger than age 35, ask your care team if you should be screened for diabetes.  Cholesterol test: At age 39, start having a cholesterol test every 5 years, or more often if advised.  Bone density scan (DEXA): At age 50, ask your care team if you should have this scan for osteoporosis (brittle bones).  Hepatitis C: Get tested at least once in your life.  STIs (sexually  transmitted infections)  Before age 24: Ask your care team if you should be screened for STIs.  After age 24: Get screened for STIs if you're at risk. You are at risk for STIs (including HIV) if:  You are sexually active with more than one person.  You don't use condoms every time.  You or a partner was diagnosed with a sexually transmitted infection.  If you are at risk for HIV, ask about PrEP medicine to prevent HIV.  Get tested for HIV at least once in your life, whether you are at risk for HIV or not.  Cancer screening tests  Cervical cancer screening: If you have a cervix, begin getting regular cervical cancer screening tests starting at age 21.  Breast cancer scan (mammogram): If you've ever had breasts, begin having regular mammograms starting at age 40. This is a scan to check for breast cancer.  Colon cancer screening: It is important to start screening for colon cancer at age 45.  Have a colonoscopy test every 10 years (or more often if you're at risk) Or, ask your provider about stool tests like a FIT test every year or Cologuard test every 3 years.  To learn more about your testing options, visit:   .  For help making a decision, visit:   https://bit.ly/fw35772.  Prostate cancer screening test: If you have a prostate, ask your care team if a prostate cancer screening test (PSA) at age 55 is right for you.  Lung cancer screening: If you are a current or former smoker ages 50 to 80, ask your care team if ongoing lung cancer screenings are right for you.  For informational purposes only. Not to replace the advice of your health care provider. Copyright   2023 [facility]. All rights reserved. Clinically reviewed by the [facility] Transitions Program. [employer] 985378 - REV 01/24.  Safer Sex: Care Instructions  Overview  Safer sex is a way to reduce your risk of getting a sexually transmitted infection (STI). It can also help prevent pregnancy.  Several products can help you practice  safer sex and reduce your chance of STIs. One of the best is a condom. There are internal and external condoms. You can use a special rubber sheet (dental dam) for protection during oral sex. Disposable gloves can keep your hands from touching blood, semen, or other body fluids that can carry infections.  Remember that birth control methods such as diaphragms, IUDs, foams, and birth control pills do not stop you from getting STIs.  Follow-up care is a key part of your treatment and safety. Be sure to make and go to all appointments, and call your doctor if you are having problems. It's also a good idea to know your test results and keep a list of the medicines you take.  How can you care for yourself at home?  Think about getting vaccinated to help prevent hepatitis A, hepatitis B, and human papillomavirus (HPV). They can be spread through sex.  Use a condom every time you have sex. Use an external condom, which goes on the penis. Or use an internal condom, which goes into the vagina or anus.  Make sure you use the right size external condom. A condom that's too small can break easily. A condom that's too big can slip off during sex.  Use a new condom each time you have sex. Be careful not to poke a hole in the condom when you open the wrapper.  Don't use an internal condom and an external condom at the same time.  Never use petroleum jelly (such as Vaseline), grease, hand lotion, baby oil, or anything with oil in it. These products can make holes in the condom.  After intercourse, hold the edge of the condom as you remove it. This will help keep semen from spilling out of the condom.  Do not have sex with anyone who has symptoms of an STI, such as sores on the genitals or mouth.  Do not drink a lot of alcohol or use drugs before sex.  Limit your sex partners. Sex with one partner who has sex only with you can reduce your risk of getting an STI.  Don't share sex toys. But if you do share them, use a condom and clean  "the sex toys between each use.  Talk to any partners before you have sex. Talk about what you feel comfortable with and whether you have any boundaries with sex. And find out if your partner or partners may be at risk for any STI. Keep in mind that a person may be able to spread an STI even if they do not have symptoms. You and any partners may want to get tested for STIs.  Where can you learn more?  Go to https://www.[employer].net/patiented  Enter B608 in the search box to learn more about \"Safer Sex: Care Instructions.\"  Current as of: November 27, 2023               Content Version: 14.0    2659-9112 [employer].   Care instructions adapted under license by your healthcare professional. If you have questions about a medical condition or this instruction, always ask your healthcare professional. [employer] disclaims any warranty or liability for your use of this information.         "

## 2024-07-26 NOTE — PROGRESS NOTES
"Preventive Care Visit  [facility]  ARTEMIO Daley CNP, Family Medicine  Jul 26, 2024      Assessment & Plan     Routine general medical examination at a health care facility  Doing well. Would like to discuss infertility issues.   - CBC with platelets; Future  - Basic metabolic panel  (Ca, Cl, CO2, Creat, Gluc, K, Na, BUN); Future  - Lipid panel reflex to direct LDL Non-fasting; Future  - Hemoglobin A1c; Future  - PSA, screen; Future  - CBC with platelets  - Basic metabolic panel  (Ca, Cl, CO2, Creat, Gluc, K, Na, BUN)  - Lipid panel reflex to direct LDL Non-fasting  - Hemoglobin A1c  - PSA, screen    Screen for colon cancer  - Colonoscopy Screening  Referral; Future    Screening for HIV (human immunodeficiency virus)  - HIV Antigen Antibody Combo; Future    Need for hepatitis C screening test  - Hepatitis C Screen Reflex to HCV RNA Quant and Genotype; Future    Infertility counseling  Reports inability to conceive for more than 12 months of frequent unprotected intercourse. No history of STDs,  chemo or radiotherapy.Would like a semen analysis completed.   - Discussed lifestyle modifications like exercise, decrease alcohol intake, decrease caffeine intake.  - Discussed stress reduction coping mechanisms  Avoid water-based lubricants as this may decrease sperm motility  - Adult Urology  Referral; Future    Patient has been advised of split billing requirements and indicates understanding: Yes    BMI  Estimated body mass index is 27.82 kg/m  as calculated from the following:    Height as of this encounter: 1.702 m (5' 7\").    Weight as of this encounter: 80.6 kg (177 lb 9.6 oz).       Counseling  Appropriate preventive services were addressed with this patient via screening, questionnaire, or discussion as appropriate for fall prevention, nutrition, physical activity, Tobacco-use cessation, weight loss and cognition.  Checklist reviewing preventive services " available has been given to the patient.  Reviewed patient's diet, addressing concerns and/or questions.         Subjective   Heather is a 53 year old, presenting for the following:  Physical        7/26/2024     1:53 PM   Additional Questions   Roomed by Ayaka   Accompanied by self         7/26/2024     1:53 PM   Patient Reported Additional Medications   Patient reports taking the following new medications No        Health Care Directive  Patient does not have a Health Care Directive or Living Will: Discussed advance care planning with patient; however, patient declined at this time.    HPI  Presents for a preventative visit and to discuss fertility            7/26/2024   General Health   How would you rate your overall physical health? (!) DECLINE   Feel stress (tense, anxious, or unable to sleep) Not at all            7/26/2024   Nutrition   Three or more servings of calcium each day? (!) I DON'T KNOW   Diet: Regular (no restrictions)   How many servings of fruit and vegetables per day? (!) I DON'T KNOW   How many sweetened beverages each day? (!) I DON'T KNOW            7/26/2024   Exercise   Days per week of moderate/strenous exercise Patient declined   Average minutes spent exercising at this level 50 min            7/26/2024   Social Factors   Frequency of gathering with friends or relatives More than three times a week   Worry food won't last until get money to buy more Patient declined   Food not last or not have enough money for food? Patient declined   Do you have housing? (Housing is defined as stable permanent housing and does not include staying ouside in a car, in a tent, in an abandoned building, in an overnight shelter, or couch-surfing.) Patient declined   Are you worried about losing your housing? No   Lack of transportation? No   Unable to get utilities (heat,electricity)? No            7/26/2024   Fall Risk   Fallen 2 or more times in the past year? No   Trouble with walking or balance? No              7/26/2024   Dental   Dentist two times every year? (!) DECLINE            7/26/2024   TB Screening   Were you born outside of the [place]? Yes              Today's PHQ-2 Score:       5/15/2024     8:45 AM   PHQ-2 ( 1999 [employer])   Q1: Little interest or pleasure in doing things 0   Q2: Feeling down, depressed or hopeless 0   PHQ-2 Score 0         7/26/2024   Substance Use   Alcohol more than 3/day or more than 7/wk No   Do you use any other substances recreationally? No        Social History     Tobacco Use    Smoking status: Never     Passive exposure: Never    Smokeless tobacco: Never   Vaping Use    Vaping status: Never Used   Substance Use Topics    Alcohol use: Never    Drug use: Never             7/26/2024   One time HIV Screening   Previous HIV test? Yes          7/26/2024   STI Screening   New sexual partner(s) since last STI/HIV test? (!) YES       ASCVD Risk   The 10-year ASCVD risk score (Jaret HEMPHILL, et al., 2019) is: 9.4%    Values used to calculate the score:      Age: 53 years      Sex: Male      Is Non- : Yes      Diabetic: No      Tobacco smoker: No      Systolic Blood Pressure: 156 mmHg      Is BP treated: No      HDL Cholesterol: 29 mg/dL      Total Cholesterol: 156 mg/dL           Reviewed and updated as needed this visit by Provider                      Review of systems:   CONSTITUTIONAL:NEGATIVE for fever, chills, change in weight  INTEGUMENTARY/SKIN: NEGATIVE for worrisome rashes, moles or lesions  EYES: NEGATIVE for vision changes or irritation  ENT: NEGATIVE for ear, mouth and throat problems  RESP:NEGATIVE for significant cough or SOB  CV: NEGATIVE for chest pain, palpitations or peripheral edema  GI: NEGATIVE for nausea, abdominal pain, heartburn, or change in bowel habits   male: denies dysuria, erectile dysfunction, decreased urinary stream, and hesitancy  MUSCULOSKELETAL:NEGATIVE for arthralgias. L plantar faciitis  NEURO: NEGATIVE for weakness, dizziness or  "paresthesias  ENDOCRINE: NEGATIVE for temperature intolerance, skin/hair changes  HEME/ALLERGY/IMMUNE: NEGATIVE for bleeding problems  PSYCHIATRIC: NEGATIVE for changes in mood or affect      Objective    Exam  BP (!) 156/95 (BP Location: Left arm, Patient Position: Sitting, Cuff Size: Adult Large)   Pulse 79   Temp 98  F (36.7  C) (Temporal)   Resp 16   Ht 1.702 m (5' 7\")   Wt 80.6 kg (177 lb 9.6 oz)   SpO2 97%   BMI 27.82 kg/m     Estimated body mass index is 27.82 kg/m  as calculated from the following:    Height as of this encounter: 1.702 m (5' 7\").    Weight as of this encounter: 80.6 kg (177 lb 9.6 oz).    Physical Exam  GENERAL: alert and no distress  EYES: Eyes grossly normal to inspection, PERRL and conjunctivae and sclerae normal  HENT: ear canals and TM's normal, nose and mouth without ulcers or lesions  NECK: no adenopathy, no asymmetry, masses, or scars  RESP: lungs clear to auscultation - no rales, rhonchi or wheezes  CV: regular rate and rhythm, normal S1 S2, no S3 or S4, no murmur, click or rub, no peripheral edema  ABDOMEN: soft, nontender, no hepatosplenomegaly, no masses and bowel sounds normal  MS: no gross musculoskeletal defects noted, no edema  SKIN: no suspicious lesions or rashes  NEURO: Normal strength and tone, mentation intact and speech normal  PSYCH: mentation appears normal, affect normal/bright    Signed Electronically by: ARTEMIO Daley CNP    "

## 2024-07-27 LAB
ANION GAP SERPL CALCULATED.3IONS-SCNC: 9 MMOL/L (ref 7–15)
BUN SERPL-MCNC: 13.5 MG/DL (ref 6–20)
CALCIUM SERPL-MCNC: 9.3 MG/DL (ref 8.8–10.4)
CHLORIDE SERPL-SCNC: 102 MMOL/L (ref 98–107)
CHOLEST SERPL-MCNC: 159 MG/DL
CREAT SERPL-MCNC: 0.91 MG/DL (ref 0.67–1.17)
EGFRCR SERPLBLD CKD-EPI 2021: >90 ML/MIN/1.73M2
FASTING STATUS PATIENT QL REPORTED: NO
FASTING STATUS PATIENT QL REPORTED: NO
GLUCOSE SERPL-MCNC: 79 MG/DL (ref 70–99)
HCO3 SERPL-SCNC: 28 MMOL/L (ref 22–29)
HDLC SERPL-MCNC: 24 MG/DL
HIV 1+2 AB+HIV1 P24 AG SERPL QL IA: NONREACTIVE
LDLC SERPL CALC-MCNC: 100 MG/DL
NONHDLC SERPL-MCNC: 135 MG/DL
POTASSIUM SERPL-SCNC: 4.1 MMOL/L (ref 3.4–5.3)
PSA SERPL DL<=0.01 NG/ML-MCNC: 1.4 NG/ML (ref 0–3.5)
SODIUM SERPL-SCNC: 139 MMOL/L (ref 135–145)
TRIGL SERPL-MCNC: 177 MG/DL

## 2024-07-31 ENCOUNTER — TELEPHONE (OUTPATIENT)
Dept: UROLOGY | Facility: CLINIC | Age: 54
End: 2024-07-31
Payer: COMMERCIAL

## 2024-07-31 DIAGNOSIS — N46.9 INFERTILITY MALE: Primary | ICD-10-CM

## 2024-07-31 NOTE — TELEPHONE ENCOUNTER
[facility] Call Center    Phone Message    May a detailed message be left on voicemail: yes     Reason for Call: Order(s): Other:   Reason for requested: Infertility appt on 10/30/24  Date needed: prior to 10/30  Provider name: Zi    Please call pt when labs orders are in. Thank you!    Action Taken: Message routed to:  Adult Clinics: Urology p [id number]    Travel Screening: Not Applicable     Date of Service:

## 2024-07-31 NOTE — LETTER
Dr. Zi Victoria has placed a few labs for you to complete prior to your appointment with him. They are listed below. These need to be completed prior to your visit with Dr. Pinon. Based on availability once your labs are completed we may be able to move your appointment up if you call clinic and request us to do so.      Semen Analysis (Strict Morph). For the sample the patient needs to abstain from ejaculation for 2-5 days prior, and the sample should be collected in clinic. Ejaculating too frequently can deplete the count whereas abstaining for long periods of time can decrease the number of live sperm. No lubrication, powders, saliva, or intercourse can be used. Partners can be in the room and help produce the sample. Please call and set an appointment up right away. They tend to book up to 1 month ahead.  If this has already been completed it can be faxed to ([phone]).    [facility]  [address] [place] [address]  [phone]      Lab work - Testosterone free and total, FSH (follicle stimulating hormone), and Estradiol ultra sensitive. This lab work can be done at any [facility] lab but must be drawn early in the morning before 8 am. Please complete these labs at least 2 weeks prior to your appointment. Some of the labs can take over a week to process.      Any previous OB/GYN office notes, urology office notes, or operative reports (varicocele, and testicular surgery or treatment). This can be faxed to ([phone]).    If you have any questions or concerns please contact us here at the clinic or Mychart us.     Thank you  Your Urology Care Team

## 2024-07-31 NOTE — TELEPHONE ENCOUNTER
Orders pended for RN to sign. Mychart sent to patient.     Once orders are signed please send to scheduling so they can assist with AM labs.     Beba Cloud LPN on 7/31/2024 at 10:03 AM

## 2024-07-31 NOTE — TELEPHONE ENCOUNTER
7/31 Called patient and left voicemail. Provided patient with [phone] as a call back number to Urology.     Patient has New Infertility appointment scheduled in Oct with Dr. Pinon. Patient needs to complete labs prior.     Blood Draw- Drawn prior to 8 AM for most accurate results. Can be completed at any [facility].  Semen Analysis- Completed at [facility] in [place]. Patient needs to call [phone] to schedule.     Once labs are completed, patient may have the opportunity to reschedule consult with provider to a sooner date.     Jessica nino Complex   Dermatology, Surgery, Urology  [facility]

## 2024-08-05 NOTE — TELEPHONE ENCOUNTER
8/5 Spoke with patient and informed him of [employer] message that was sent with lab work that needs to be completed prior to upcoming visit with  in October - patient stated he will read message again and call back tomorrow.     Carleen Sahni MA on 8/5/2024 at 1:12 PM

## 2024-09-04 ENCOUNTER — LAB (OUTPATIENT)
Dept: LAB | Facility: CLINIC | Age: 54
End: 2024-09-04
Payer: COMMERCIAL

## 2024-09-04 DIAGNOSIS — N46.9 INFERTILITY MALE: ICD-10-CM

## 2024-09-04 LAB
FSH SERPL IRP2-ACNC: 4.1 MIU/ML (ref 1.5–12.4)
SHBG SERPL-SCNC: 63 NMOL/L (ref 11–80)

## 2024-09-04 PROCEDURE — 83001 ASSAY OF GONADOTROPIN (FSH): CPT

## 2024-09-04 PROCEDURE — 36415 COLL VENOUS BLD VENIPUNCTURE: CPT

## 2024-09-04 PROCEDURE — 84403 ASSAY OF TOTAL TESTOSTERONE: CPT

## 2024-09-04 PROCEDURE — 82670 ASSAY OF TOTAL ESTRADIOL: CPT

## 2024-09-04 PROCEDURE — 84270 ASSAY OF SEX HORMONE GLOBUL: CPT

## 2024-09-05 LAB
TESTOST FREE SERPL-MCNC: 10 NG/DL
TESTOST SERPL-MCNC: 702 NG/DL (ref 240–950)

## 2024-09-11 ENCOUNTER — OFFICE VISIT (OUTPATIENT)
Dept: ORTHOPEDICS | Facility: CLINIC | Age: 54
End: 2024-09-11
Payer: COMMERCIAL

## 2024-09-11 VITALS
BODY MASS INDEX: 27.78 KG/M2 | HEIGHT: 67 IN | DIASTOLIC BLOOD PRESSURE: 92 MMHG | SYSTOLIC BLOOD PRESSURE: 148 MMHG | WEIGHT: 177 LBS | HEART RATE: 76 BPM

## 2024-09-11 DIAGNOSIS — M72.2 PLANTAR FASCIITIS OF RIGHT FOOT: Primary | ICD-10-CM

## 2024-09-11 LAB — ESTRADIOL SERPL HS-MCNC: 28 PG/ML (ref 10–40)

## 2024-09-11 PROCEDURE — 99213 OFFICE O/P EST LOW 20 MIN: CPT | Performed by: FAMILY MEDICINE

## 2024-09-11 NOTE — LETTER
"2024      Mariama Larios  [address] [place] [address]      Dear Colleague,    Thank you for referring your patient, Mariama Larios, to the [facility]. Please see a copy of my visit note below.    Mariama Larios  :  1970  DOS: 2024  MRN: [id number]    Sports Medicine Clinic Visit    PCP: Junior Dangelo    Mariama Larios is a 53 year old male who is seen in consultation at the request of  Ann-Marie Curran PA-C presenting with right foot pain.    Injury: Gradual onset of pain over the past 4+ months.  Pain located over right plantar heel, medial arch, radiating to dorsal forefoot.  Additional Features:  Positive: swelling and antalgic gait.  Symptoms are better with Ice and Aleve.  Symptoms are worse with: initial steps in AM & after prolonged sitting, prolonged walking/standing.  Other evaluation and/or treatments so far consists of: Ice, Aleve, Rest, and self massage, stretching.  Prior imaging: No recent imaging completed.  Prior History of related problems: none    Social History: currently employed as [employer]     Interim History - 2024  Since last visit on 2024 patient states that his foot is feeling great. No changes or concerns. Here to discuss return to work without restrictions. No interim injury.       Review of Systems  Musculoskeletal: as above  Remainder of review of systems is negative including constitutional, CV, pulmonary, GI, Skin and Neurologic except as noted in HPI or medical history.    No past medical history on file.  No past surgical history on file.  Family History   Problem Relation Age of Onset     Hypertension Father      Other - See Comments Father         stroke at 75     Hypertension Brother      Hypertension Brother        Objective  BP (!) 148/92   Pulse 76   Ht 1.702 m (5' 7\")   Wt 80.3 kg (177 lb)   BMI 27.72 kg/m      General: healthy, alert and in " no distress    HEENT: no scleral icterus or conjunctival erythema   Skin: no suspicious lesions or rash. No jaundice.   CV: regular rhythm by palpation, 2+ distal pulses, no pedal edema    Resp: normal respiratory effort without conversational dyspnea   Psych: normal mood and affect    Gait: antalgic, appropriate coordination and balance   Neuro: normal light touch sensory exam of the extremities. Motor strength as noted below     Right Ankle/Foot Exam:    Inspection:       No visible ecchymosis       No visible edema or effusion       Normal DP artery pulse       Normal PT artery pulse    Foot inspection:       no deformity noted    ROM:        full ROM with dorsiflexion, plantarflexion, inversion and eversion    Tender:       Resolved over the plantar fascia proximally at attachment to calcaneus       Minimal 1st MTP joint TTP       Resolved TTP of the medial and lateral gastrocs    Non-Tender:       remainder of foot and ankle       lateral malleolus       lateral ankle ligaments       deltoid ligament       posterior edge of lateral malleolus       proximal 5th metatarsal       dorsal tibiotalar joint       tarsal navicular       medial malleolus       distal tibiofibular joint       tibialis posterior tendon, posterior to medial malleolus       peroneal tendon sheath    Skin:       well perfused       capillary refill less than 2 seconds    Special Tests:       neg (-) anterior drawer        neg (-) talar tilt        neg (-) external rotation testing     Gait:       antalgic gait    Proprioception:        deferred      Radiology:  Recent Results (from the past 744 hour(s))   XR Foot Right G/E 3 Views    Narrative    XR FOOT RIGHT G/E 3 VIEWS 5/15/2024 8:53 AM    HISTORY: Pain in joint involving right ankle and foot    COMPARISON: None.      Impression    IMPRESSION: No fracture or degenerative changes. Normal alignment.    ERICA OROZCO MD         SYSTEM ID:  [id number]       Assessment:  1. Plantar  fasciitis of right foot        Plan:  Discussed the assessment with the patient.  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  Letter for work provided to return without restriction   HEP reviewed, PT offered and available in the future if needed, continue to defer for now  Oral Tylenol and topical Voltaren gel reviewed as safe OTC options, reviewed safe dosing strategies  Heel cup options reviewed  Footwear options and strategies reviewed  We discussed modified progressive pain-free activity as tolerated  Supportive care reviewed  All questions were answered today  Contact us with additional questions or concerns  Signs and sx of concern reviewed      Bryce Lester DO, ROSA MARIA  Sports Medicine Physician  [facility]              Again, thank you for allowing me to participate in the care of your patient.        Sincerely,        Bryce Lester DO

## 2024-09-11 NOTE — LETTER
September 11, 2024      Mariama Larios  [address] [facility] [address]        To Whom It May Concern:    Mariama Larios was seen in my clinic today and has been under my care. He may return to work and is ready to complete 100% of his normal work duties without restrictions starting September 11, 2024.  The treatments we have provided have been safe, effective, and well-tolerated without side effects that would hinder the operation of a Commercial Motor Vehicle.  Updated recommendations will be provided as needed only.      Bryce Sarkar DO, CABJ  Sports Medicine Physician  [facility]

## 2024-09-11 NOTE — PROGRESS NOTES
"Mariama Larios  :  1970  DOS: 2024  MRN: [id number]    Sports Medicine Clinic Visit    PCP: Junior Dangelo    Mariama Larios is a 53 year old male who is seen in consultation at the request of  Ann-Marie Curran PA-C presenting with right foot pain.    Injury: Gradual onset of pain over the past 4+ months.  Pain located over right plantar heel, medial arch, radiating to dorsal forefoot.  Additional Features:  Positive: swelling and antalgic gait.  Symptoms are better with Ice and Aleve.  Symptoms are worse with: initial steps in AM & after prolonged sitting, prolonged walking/standing.  Other evaluation and/or treatments so far consists of: Ice, Aleve, Rest, and self massage, stretching.  Prior imaging: No recent imaging completed.  Prior History of related problems: none    Social History: currently employed as [employer]     Interim History - 2024  Since last visit on 2024 patient states that his foot is feeling great. No changes or concerns. Here to discuss return to work without restrictions. No interim injury.       Review of Systems  Musculoskeletal: as above  Remainder of review of systems is negative including constitutional, CV, pulmonary, GI, Skin and Neurologic except as noted in HPI or medical history.    No past medical history on file.  No past surgical history on file.  Family History   Problem Relation Age of Onset    Hypertension Father     Other - See Comments Father         stroke at 75    Hypertension Brother     Hypertension Brother        Objective  BP (!) 148/92   Pulse 76   Ht 1.702 m (5' 7\")   Wt 80.3 kg (177 lb)   BMI 27.72 kg/m      General: healthy, alert and in no distress    HEENT: no scleral icterus or conjunctival erythema   Skin: no suspicious lesions or rash. No jaundice.   CV: regular rhythm by palpation, 2+ distal pulses, no pedal edema    Resp: normal respiratory effort without conversational dyspnea   Psych: " normal mood and affect    Gait: antalgic, appropriate coordination and balance   Neuro: normal light touch sensory exam of the extremities. Motor strength as noted below     Right Ankle/Foot Exam:    Inspection:       No visible ecchymosis       No visible edema or effusion       Normal DP artery pulse       Normal PT artery pulse    Foot inspection:       no deformity noted    ROM:        full ROM with dorsiflexion, plantarflexion, inversion and eversion    Tender:       Resolved over the plantar fascia proximally at attachment to calcaneus       Minimal 1st MTP joint TTP       Resolved TTP of the medial and lateral gastrocs    Non-Tender:       remainder of foot and ankle       lateral malleolus       lateral ankle ligaments       deltoid ligament       posterior edge of lateral malleolus       proximal 5th metatarsal       dorsal tibiotalar joint       tarsal navicular       medial malleolus       distal tibiofibular joint       tibialis posterior tendon, posterior to medial malleolus       peroneal tendon sheath    Skin:       well perfused       capillary refill less than 2 seconds    Special Tests:       neg (-) anterior drawer        neg (-) talar tilt        neg (-) external rotation testing     Gait:       antalgic gait    Proprioception:        deferred      Radiology:  Recent Results (from the past 744 hour(s))   XR Foot Right G/E 3 Views    Narrative    XR FOOT RIGHT G/E 3 VIEWS 5/15/2024 8:53 AM    HISTORY: Pain in joint involving right ankle and foot    COMPARISON: None.      Impression    IMPRESSION: No fracture or degenerative changes. Normal alignment.    ERICA OROZCO MD         SYSTEM ID:  [id number]       Assessment:  1. Plantar fasciitis of right foot        Plan:  Discussed the assessment with the patient.  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  Letter for work provided to return without restriction   HEP reviewed, PT offered and available in the future if needed, continue to defer for now  Oral Tylenol and topical Voltaren gel reviewed as safe OTC options, reviewed safe dosing strategies  Heel cup options reviewed  Footwear options and strategies reviewed  We discussed modified progressive pain-free activity as tolerated  Supportive care reviewed  All questions were answered today  Contact us with additional questions or concerns  Signs and sx of concern reviewed      Bryce Lester DO, ROSA MARIA  Sports Medicine Physician  [facility]

## 2024-09-16 ENCOUNTER — PATIENT OUTREACH (OUTPATIENT)
Dept: CARE COORDINATION | Facility: CLINIC | Age: 54
End: 2024-09-16
Payer: COMMERCIAL

## 2024-12-26 ENCOUNTER — OFFICE VISIT (OUTPATIENT)
Dept: URGENT CARE | Facility: URGENT CARE | Age: 54
End: 2024-12-26
Payer: COMMERCIAL

## 2024-12-26 ENCOUNTER — HOSPITAL ENCOUNTER (EMERGENCY)
Facility: CLINIC | Age: 54
Discharge: HOME | End: 2024-12-26
Attending: INTERNAL MEDICINE
Payer: COMMERCIAL

## 2024-12-26 ENCOUNTER — ANCILLARY PROCEDURE (OUTPATIENT)
Dept: GENERAL RADIOLOGY | Facility: CLINIC | Age: 54
End: 2024-12-26
Attending: PHYSICIAN ASSISTANT
Payer: COMMERCIAL

## 2024-12-26 VITALS
SYSTOLIC BLOOD PRESSURE: 144 MMHG | OXYGEN SATURATION: 96 % | DIASTOLIC BLOOD PRESSURE: 86 MMHG | RESPIRATION RATE: 18 BRPM | BODY MASS INDEX: 28.82 KG/M2 | TEMPERATURE: 97.7 F | WEIGHT: 184 LBS | HEART RATE: 65 BPM

## 2024-12-26 VITALS
WEIGHT: 184 LBS | BODY MASS INDEX: 28.82 KG/M2 | RESPIRATION RATE: 18 BRPM | HEART RATE: 79 BPM | DIASTOLIC BLOOD PRESSURE: 80 MMHG | OXYGEN SATURATION: 93 % | TEMPERATURE: 99 F | SYSTOLIC BLOOD PRESSURE: 143 MMHG

## 2024-12-26 DIAGNOSIS — R50.9 FEVER, UNSPECIFIED FEVER CAUSE: Primary | ICD-10-CM

## 2024-12-26 DIAGNOSIS — R50.9 FEVER, UNSPECIFIED FEVER CAUSE: ICD-10-CM

## 2024-12-26 DIAGNOSIS — J98.11 ATELECTASIS OF RIGHT LUNG: ICD-10-CM

## 2024-12-26 DIAGNOSIS — S29.011A MUSCLE STRAIN OF CHEST WALL, INITIAL ENCOUNTER: ICD-10-CM

## 2024-12-26 LAB
ATRIAL RATE - MUSE: 68 BPM
DIASTOLIC BLOOD PRESSURE - MUSE: NORMAL MMHG
FLUAV RNA SPEC QL NAA+PROBE: NEGATIVE
FLUBV RNA RESP QL NAA+PROBE: NEGATIVE
INTERPRETATION ECG - MUSE: NORMAL
P AXIS - MUSE: 58 DEGREES
PR INTERVAL - MUSE: 148 MS
QRS DURATION - MUSE: 92 MS
QT - MUSE: 390 MS
QTC - MUSE: 414 MS
R AXIS - MUSE: 54 DEGREES
RSV RNA SPEC NAA+PROBE: NEGATIVE
SARS-COV-2 RNA RESP QL NAA+PROBE: NEGATIVE
SYSTOLIC BLOOD PRESSURE - MUSE: NORMAL MMHG
T AXIS - MUSE: 39 DEGREES
VENTRICULAR RATE- MUSE: 68 BPM

## 2024-12-26 PROCEDURE — 99284 EMERGENCY DEPT VISIT MOD MDM: CPT | Mod: 25 | Performed by: INTERNAL MEDICINE

## 2024-12-26 PROCEDURE — 71046 X-RAY EXAM CHEST 2 VIEWS: CPT | Mod: TC | Performed by: RADIOLOGY

## 2024-12-26 PROCEDURE — 87637 SARSCOV2&INF A&B&RSV AMP PRB: CPT | Performed by: INTERNAL MEDICINE

## 2024-12-26 PROCEDURE — 76604 US EXAM CHEST: CPT | Mod: 26 | Performed by: INTERNAL MEDICINE

## 2024-12-26 PROCEDURE — 93005 ELECTROCARDIOGRAM TRACING: CPT | Performed by: INTERNAL MEDICINE

## 2024-12-26 PROCEDURE — 76604 US EXAM CHEST: CPT | Performed by: INTERNAL MEDICINE

## 2024-12-26 PROCEDURE — 99285 EMERGENCY DEPT VISIT HI MDM: CPT | Mod: 25 | Performed by: INTERNAL MEDICINE

## 2024-12-26 PROCEDURE — 93010 ELECTROCARDIOGRAM REPORT: CPT | Performed by: INTERNAL MEDICINE

## 2024-12-26 RX ORDER — NAPROXEN 500 MG/1
500 TABLET ORAL 2 TIMES DAILY PRN
Qty: 30 TABLET | Refills: 0 | Status: SHIPPED | OUTPATIENT
Start: 2024-12-26

## 2024-12-26 ASSESSMENT — ENCOUNTER SYMPTOMS
MYALGIAS: 0
HEMATURIA: 0
CARDIOVASCULAR NEGATIVE: 1
NAUSEA: 0
PALPITATIONS: 0
CONSTITUTIONAL NEGATIVE: 1
GASTROINTESTINAL NEGATIVE: 1
CHEST TIGHTNESS: 0
FEVER: 0
DYSURIA: 0
SINUS PAIN: 0
SINUS PRESSURE: 0
MUSCULOSKELETAL NEGATIVE: 1
VOMITING: 0
FREQUENCY: 0
SORE THROAT: 0
CHILLS: 0
HEADACHES: 0
WHEEZING: 0
COUGH: 1
ABDOMINAL PAIN: 0
DIARRHEA: 0
ALLERGIC/IMMUNOLOGIC NEGATIVE: 1
NEUROLOGICAL NEGATIVE: 1
SHORTNESS OF BREATH: 1

## 2024-12-26 ASSESSMENT — COLUMBIA-SUICIDE SEVERITY RATING SCALE - C-SSRS
2. HAVE YOU ACTUALLY HAD ANY THOUGHTS OF KILLING YOURSELF IN THE PAST MONTH?: NO
1. IN THE PAST MONTH, HAVE YOU WISHED YOU WERE DEAD OR WISHED YOU COULD GO TO SLEEP AND NOT WAKE UP?: NO
6. HAVE YOU EVER DONE ANYTHING, STARTED TO DO ANYTHING, OR PREPARED TO DO ANYTHING TO END YOUR LIFE?: NO

## 2024-12-26 ASSESSMENT — ACTIVITIES OF DAILY LIVING (ADL)
ADLS_ACUITY_SCORE: 41
ADLS_ACUITY_SCORE: 41

## 2024-12-26 ASSESSMENT — PAIN SCALES - GENERAL: PAINLEVEL_OUTOF10: NO PAIN (0)

## 2024-12-26 NOTE — PROGRESS NOTES
Chief Complaint:     Chief Complaint   Patient presents with    Cough     Inhaled a lot of smoke yesterday and coughed all night and now is having trouble breathing        Results for orders placed or performed during the hospital encounter of 12/26/24   Influenza A/B, RSV and SARS-CoV2 PCR (COVID-19) Nasopharyngeal     Status: Normal    Specimen: Nasopharyngeal; Swab   Result Value Ref Range    Influenza A PCR Negative Negative    Influenza B PCR Negative Negative    RSV PCR Negative Negative    SARS CoV2 PCR Negative Negative    Narrative    Testing was performed using the Xpert Xpress CoV2/Flu/RSV Assay on the [employer] GeneXpert Instrument. This test should be ordered for the detection of SARS-CoV2, influenza, and RSV viruses in individuals with signs and symptoms of respiratory tract infection. This test is for in vitro diagnostic use under the [place] FDA for laboratories certified under CLIA to perform high or moderate complexity testing. This test has been [place] FDA cleared. A negative result does not rule out the presence of PCR inhibitors in the specimen or target RNA in concentration below the limit of detection for the assay. If only one viral target is positive but coinfection with multiple targets is suspected, the sample should be re-tested with another FDA cleared, approved, or authorized test, if coninfection would change clinical management. This test was validated by the [facility] [employer]. These laboratories are certified under the Clinical Laboratory Improvement Amendments of 1988 (CLIA-88) as qualified to perfom high complexity laboratory testing.   EKG 12-lead, tracing only     Status: None   Result Value Ref Range    Systolic Blood Pressure  mmHg    Diastolic Blood Pressure  mmHg    Ventricular Rate 68 BPM    Atrial Rate 68 BPM    NV Interval 148 ms    QRS Duration 92 ms     ms    QTc 414 ms    P Axis 58 degrees    R AXIS 54 degrees    T Axis 39 degrees    Interpretation ECG        Sinus rhythm  Normal ECG  Unconfirmed report - interpretation of this ECG is computer generated - see medical record for final interpretation    Confirmed by - EMERGENCY ROOM, PHYSICIAN ([id number]),  EDIN BLANDON ([id number]) on 12/26/2024 2:06:35 PM     Results for orders placed or performed in visit on 12/26/24   XR Chest 2 Views     Status: None    Narrative    CHEST TWO VIEWS 12/26/2024 10:36 AM     HISTORY: Fever, unspecified fever cause    COMPARISON: None.       Impression    IMPRESSION: Minimal pleural fluid or pleural thickening on the right.  There are no acute infiltrates. The cardiac silhouette is not  enlarged. Pulmonary vasculature is unremarkable.    MATTHIAS MOSS MD         SYSTEM ID:  [id number]       Medical Decision Making:    Vital signs reviewed by Joo Christopher PA-C  BP (!) 143/80 (BP Location: Left arm, Patient Position: Sitting, Cuff Size: Adult Large)   Pulse 79   Temp 99  F (37.2  C) (Oral)   Resp 18   Wt 83.5 kg (184 lb)   SpO2 93%   BMI 28.82 kg/m      Differential Diagnosis:  Pneumonia, lung trauma        ASSESSMENT    1. Fever, unspecified fever cause        PLAN    Patient is having a difficult time breathing.    Temp is 99 in clinic today, lung sounds were diminished, and O2 sats at 93% on RA.    CXR shows fluid in the R lower lung per my read.    Unclear cause of his symptoms.  With exposure to chemicals, patient instructed to go to the ED now for further evaluation, labs, and possible advanced imaging and admission.  Patient verbalized understanding and agreed with this plan.    Labs:    Results for orders placed or performed during the hospital encounter of 12/26/24   Influenza A/B, RSV and SARS-CoV2 PCR (COVID-19) Nasopharyngeal     Status: Normal    Specimen: Nasopharyngeal; Swab   Result Value Ref Range    Influenza A PCR Negative Negative    Influenza B PCR Negative Negative    RSV PCR Negative Negative    SARS CoV2 PCR Negative Negative    Narrative    Testing was performed  using the Xpert Xpress CoV2/Flu/RSV Assay on the [employer] GeneXpert Instrument. This test should be ordered for the detection of SARS-CoV2, influenza, and RSV viruses in individuals with signs and symptoms of respiratory tract infection. This test is for in vitro diagnostic use under the [place] FDA for laboratories certified under CLIA to perform high or moderate complexity testing. This test has been [place] FDA cleared. A negative result does not rule out the presence of PCR inhibitors in the specimen or target RNA in concentration below the limit of detection for the assay. If only one viral target is positive but coinfection with multiple targets is suspected, the sample should be re-tested with another FDA cleared, approved, or authorized test, if coninfection would change clinical management. This test was validated by the [facility]. These laboratories are certified under the Clinical Laboratory Improvement Amendments of 1988 (CLIA-88) as qualified to perfom high complexity laboratory testing.   EKG 12-lead, tracing only     Status: None   Result Value Ref Range    Systolic Blood Pressure  mmHg    Diastolic Blood Pressure  mmHg    Ventricular Rate 68 BPM    Atrial Rate 68 BPM    NM Interval 148 ms    QRS Duration 92 ms     ms    QTc 414 ms    P Axis 58 degrees    R AXIS 54 degrees    T Axis 39 degrees    Interpretation ECG       Sinus rhythm  Normal ECG  Unconfirmed report - interpretation of this ECG is computer generated - see medical record for final interpretation    Confirmed by - EMERGENCY ROOM, PHYSICIAN ([id number]),  EDIN BLANDON ([id number]) on 12/26/2024 2:06:35 PM     Results for orders placed or performed in visit on 12/26/24   XR Chest 2 Views     Status: None    Narrative    CHEST TWO VIEWS 12/26/2024 10:36 AM     HISTORY: Fever, unspecified fever cause    COMPARISON: None.       Impression    IMPRESSION: Minimal pleural fluid or pleural thickening on the right.  There are no acute  infiltrates. The cardiac silhouette is not  enlarged. Pulmonary vasculature is unremarkable.    MATTHIAS MOSS MD         SYSTEM ID:  [id number]        Vital signs reviewed by Joo Christopher PA-C  BP (!) 143/80 (BP Location: Left arm, Patient Position: Sitting, Cuff Size: Adult Large)   Pulse 79   Temp 99  F (37.2  C) (Oral)   Resp 18   Wt 83.5 kg (184 lb)   SpO2 93%   BMI 28.82 kg/m      Current Meds      Current Outpatient Medications:     naproxen (NAPROSYN) 500 MG tablet, Take 1 tablet (500 mg) by mouth 2 times daily as needed for moderate pain., Disp: 30 tablet, Rfl: 0      Respiratory History    occasional episodes of bronchitis      SUBJECTIVE    HPI: Mariama Larios is an 54 year old male who presents with chest congestion, cough nonproductive, occasional, and shortness of breath.  Symptoms began 1  days ago and has unchanged.  Patient states that he was cooking some red palm oil on his stove top ad it began to smoke and this caused SOB.  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  He denies any palpitations, or dizziness.      Patient denies any recent travel or exposure to known COVID positive tested individual.      ROS:     Review of Systems   Constitutional: Negative.  Negative for chills and fever.   HENT: Negative.  Negative for congestion, sinus pressure, sinus pain and sore throat.    Respiratory:  Positive for cough and shortness of breath. Negative for chest tightness and wheezing.    Cardiovascular: Negative.  Negative for chest pain and palpitations.   Gastrointestinal: Negative.  Negative for abdominal pain, diarrhea, nausea and vomiting.   Genitourinary:  Negative for dysuria, frequency, hematuria and urgency.   Musculoskeletal: Negative.  Negative for myalgias.   Skin:  Negative for rash.   Allergic/Immunologic: Negative.  Negative for immunocompromised state.   Neurological: Negative.  Negative for headaches.         Family History   Family History   Problem Relation Age  of Onset    Hypertension Father     Other - See Comments Father         stroke at 75    Hypertension Brother     Hypertension Brother         Problem history  Patient Active Problem List   Diagnosis    Gastroesophageal reflux disease without esophagitis    Stage 1 hypertension    Hemorrhoids, unspecified hemorrhoid type    CARDIOVASCULAR SCREENING; LDL GOAL LESS THAN 130        Allergies  No Known Allergies     Social History  Social History     Socioeconomic History    Marital status:      Spouse name: Not on file    Number of children: Not on file    Years of education: Not on file    Highest education level: Not on file   Occupational History    Not on file   Tobacco Use    Smoking status: Never     Passive exposure: Never    Smokeless tobacco: Never   Vaping Use    Vaping status: Never Used   Substance and Sexual Activity    Alcohol use: Never    Drug use: Never    Sexual activity: Not on file   Other Topics Concern    Not on file   Social History Narrative    Not on file     Social Drivers of Health     Financial Resource Strain: Low Risk  (7/26/2024)    Financial Resource Strain     Within the past 12 months, have you or your family members you live with been unable to get utilities (heat, electricity) when it was really needed?: No   Food Insecurity: Unknown (7/26/2024)    Food Insecurity     Within the past 12 months, did you worry that your food would run out before you got money to buy more?: Patient declined     Within the past 12 months, did the food you bought just not last and you didn t have money to get more?: Patient declined   Transportation Needs: Low Risk  (7/26/2024)    Transportation Needs     Within the past 12 months, has lack of transportation kept you from medical appointments, getting your medicines, non-medical meetings or appointments, work, or from getting things that you need?: No   Physical Activity: Unknown (7/26/2024)    Exercise Vital Sign     Days of Exercise per Week:  Patient declined     Minutes of Exercise per Session: 50 min   Stress: No Stress Concern Present (7/26/2024)    [employer] - Occupational Stress Questionnaire     Feeling of Stress : Not at all   Social Connections: Unknown (7/26/2024)    Social Connection and Isolation Panel [NHANES]     Frequency of Communication with Friends and Family: Not on file     Frequency of Social Gatherings with Friends and Family: More than three times a week     Attends Hinduism Services: Not on file     Active Member of Clubs or Organizations: Not on file     Attends Club or Organization Meetings: Not on file     Marital Status: Not on file   Interpersonal Safety: Low Risk  (7/26/2024)    Interpersonal Safety     Do you feel physically and emotionally safe where you currently live?: Yes     Within the past 12 months, have you been hit, slapped, kicked or otherwise physically hurt by someone?: No     Within the past 12 months, have you been humiliated or emotionally abused in other ways by your partner or ex-partner?: No   Housing Stability: Low Risk  (7/26/2024)    Housing Stability     Do you have housing? : Patient declined     Are you worried about losing your housing?: No        OBJECTIVE     Vital signs reviewed by Joo Christopher PA-C  BP (!) 143/80 (BP Location: Left arm, Patient Position: Sitting, Cuff Size: Adult Large)   Pulse 79   Temp 99  F (37.2  C) (Oral)   Resp 18   Wt 83.5 kg (184 lb)   SpO2 93%   BMI 28.82 kg/m       Physical Exam  Vitals reviewed.   Constitutional:       General: He is not in acute distress.     Appearance: He is well-developed. He is not ill-appearing, toxic-appearing or diaphoretic.   HENT:      Head: Normocephalic and atraumatic.      Right Ear: Hearing, tympanic membrane, ear canal and external ear normal. No drainage, swelling or tenderness. Tympanic membrane is not perforated, erythematous, retracted or bulging.      Left Ear: Hearing, tympanic membrane,  ear canal and external ear normal. No drainage, swelling or tenderness. Tympanic membrane is not perforated, erythematous, retracted or bulging.      Nose: No nasal tenderness, mucosal edema, congestion or rhinorrhea.      Right Turbinates: Not enlarged or swollen.      Left Turbinates: Not enlarged or swollen.      Right Sinus: No maxillary sinus tenderness or frontal sinus tenderness.      Left Sinus: No maxillary sinus tenderness or frontal sinus tenderness.      Mouth/Throat:      Pharynx: No pharyngeal swelling, oropharyngeal exudate, posterior oropharyngeal erythema or uvula swelling.      Tonsils: No tonsillar exudate. 0 on the right. 0 on the left.   Eyes:      General: Lids are normal.         Right eye: No discharge.         Left eye: No discharge.      Conjunctiva/sclera: Conjunctivae normal.      Right eye: Right conjunctiva is not injected. No exudate.     Left eye: Left conjunctiva is not injected. No exudate.     Pupils: Pupils are equal, round, and reactive to light.   Cardiovascular:      Rate and Rhythm: Normal rate and regular rhythm.      Heart sounds: Normal heart sounds. No murmur heard.     No friction rub. No gallop.   Pulmonary:      Effort: Pulmonary effort is normal. No accessory muscle usage, respiratory distress or retractions.      Breath sounds: Decreased air movement present. No stridor or transmitted upper airway sounds. Decreased breath sounds present. No wheezing, rhonchi or rales.   Chest:      Chest wall: No tenderness.   Abdominal:      General: Bowel sounds are normal. There is no distension.      Palpations: Abdomen is soft. Abdomen is not rigid. There is no mass.      Tenderness: There is no abdominal tenderness. There is no guarding or rebound.   Musculoskeletal:         General: Normal range of motion.      Cervical back: Normal range of motion and neck supple.   Lymphadenopathy:      Head:      Right side of head: No submental, submandibular, tonsillar, preauricular or  posterior auricular adenopathy.      Left side of head: No submental, submandibular, tonsillar, preauricular or posterior auricular adenopathy.      Cervical:      Right cervical: No superficial or posterior cervical adenopathy.     Left cervical: No superficial or posterior cervical adenopathy.   Skin:     General: Skin is warm.      Capillary Refill: Capillary refill takes less than 2 seconds.   Neurological:      Mental Status: He is alert and oriented to person, place, and time.      Cranial Nerves: No cranial nerve deficit.      Sensory: No sensory deficit.      Motor: No abnormal muscle tone.      Coordination: Coordination normal.      Deep Tendon Reflexes: Reflexes normal.   Psychiatric:         Behavior: Behavior normal. Behavior is cooperative.         Thought Content: Thought content normal.         Judgment: Judgment normal.           Joo Christopher PA-C  12/26/2024, 10:27 AM

## 2024-12-26 NOTE — ED PROVIDER NOTES
[facility] EMERGENCY DEPARTMENT ([facility])    12/26/24      ED PROVIDER NOTE     History     Chief Complaint   Patient presents with    Cough    Chest Pain     The history is provided by the patient and medical records.     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  Yesterday he apparently inhaled some kind of chemical and coughed all the evening afterwards.  This morning noticed that the left side of his chest wall was very tender especially with movement and decided to come to [facility] where he had a chest x-ray done which showed possible right-sided pleural effusion and there was an episode of low O2 sat reportedly.  He feels okay as long as he is not moving and denies any shortness of breath here.  He denies any recent long car ride or plane ride or any family member with thromboembolic disease that he knows of.  He also denies any recent surgical procedures.     Past Medical History  History reviewed. No pertinent past medical history.  History reviewed. No pertinent surgical history.  naproxen (NAPROSYN) 500 MG tablet      No Known Allergies  Family History  Family History   Problem Relation Age of Onset    Hypertension Father     Other - See Comments Father         stroke at 75    Hypertension Brother     Hypertension Brother      Social History   Social History     Tobacco Use    Smoking status: Never     Passive exposure: Never    Smokeless tobacco: Never   Vaping Use    Vaping status: Never Used   Substance Use Topics    Alcohol use: Never    Drug use: Never      A medically appropriate review of systems was performed with pertinent positives and negatives noted in the HPI, and all other systems negative.    Physical Exam   BP: (!) 144/86  Pulse:  65  Temp: 97.7  F (36.5  C)  Resp: 18  Weight: 83.5 kg (184 lb)  SpO2: 96 %  Physical Exam  Constitutional:       General: He is not in acute distress.     Appearance: He is not diaphoretic.   HENT:      Head: Atraumatic.   Eyes:      General: No scleral icterus.     Pupils: Pupils are equal, round, and reactive to light.   Cardiovascular:      Rate and Rhythm: Normal rate and regular rhythm.      Heart sounds: Normal heart sounds. No murmur heard.     No friction rub. No gallop.   Pulmonary:      Effort: Pulmonary effort is normal. No respiratory distress.      Breath sounds: Normal breath sounds. No stridor. No wheezing, rhonchi or rales.   Chest:      Chest wall: Tenderness present. No mass, lacerations, deformity, swelling, crepitus or edema. There is no dullness to percussion.       Abdominal:      General: Abdomen is flat. Bowel sounds are normal. There is no distension.      Palpations: Abdomen is soft. There is no mass.      Tenderness: There is no abdominal tenderness. There is no right CVA tenderness, left CVA tenderness, guarding or rebound.      Hernia: No hernia is present.   Musculoskeletal:         General: No tenderness.   Skin:     General: Skin is warm and dry.      Findings: No rash.   Neurological:      General: No focal deficit present.      Motor: No weakness.      Coordination: Coordination normal.   Psychiatric:         Mood and Affect: Mood normal.           ED Course, Procedures, & Data      Procedures  Results for orders placed during the hospital encounter of 12/26/24    POC US CHEST B-SCAN    [facility] Procedure Note    Limited Bedside ED Ultrasound of Thorax:    PROCEDURE: PERFORMED BY: Dr. Manoj Beard MD, MD  INDICATIONS/SYMPTOM:  Chest pain  PROBE: High frequency linear probe  BODY LOCATION: Chest  FINDINGS:  Images of both lung hemithoracies taken in 2D in multiple rib spaces    Right side:  Lung sliding artifact  Present  Comet tail artifacts  Present  Left  side:  Lung sliding artifact  Present  Comet tail artifacts  Present  Hemothorax: Right side Absent  Left side Absent  Pleural effusion: Right side Absent  Left side Absent    INTERPRETATION: The exam was normal. There was no free fluid identified in the chest cavity. No evidence of pneumothorax, hemothorax or pleural effusion.  IMAGE DOCUMENTATION: Images were archived to PACs system.            Results for orders placed or performed during the hospital encounter of 12/26/24   POC US CHEST B-SCAN     Status: None    Impression    [facility] Procedure Note      Limited Bedside ED Ultrasound of Thorax:    PROCEDURE: PERFORMED BY: Dr. Manoj Beard MD, MD  INDICATIONS/SYMPTOM:  Chest pain  PROBE: High frequency linear probe  BODY LOCATION: Chest  FINDINGS:  Images of both lung hemithoracies taken in 2D in multiple rib spaces        Right side:  Lung sliding artifact  Present     Comet tail artifacts  Present   Left side:  Lung sliding artifact  Present     Comet tail artifacts  Present   Hemothorax: Right side Absent     Left side Absent   Pleural effusion: Right side Absent      Left side Absent    INTERPRETATION: The exam was normal. There was no free fluid identified in the chest cavity. No evidence of pneumothorax, hemothorax or pleural effusion.  IMAGE DOCUMENTATION: Images were archived to PACs system.        Influenza A/B, RSV and SARS-CoV2 PCR (COVID-19) Nasopharyngeal     Status: Normal    Specimen: Nasopharyngeal; Swab   Result Value Ref Range    Influenza A PCR Negative Negative    Influenza B PCR Negative Negative    RSV PCR Negative Negative    SARS CoV2 PCR Negative Negative    Narrative    Testing was performed using the Xpert Xpress CoV2/Flu/RSV Assay on the [employer] GeneXpert Instrument. This test should be ordered for the detection of SARS-CoV2, influenza, and RSV viruses in individuals with signs and symptoms of respiratory tract infection. This test is for in vitro diagnostic use under the  [place] FDA for laboratories certified under CLIA to perform high or moderate complexity testing. This test has been [place] FDA cleared. A negative result does not rule out the presence of PCR inhibitors in the specimen or target RNA in concentration below the limit of detection for the assay. If only one viral target is positive but coinfection with multiple targets is suspected, the sample should be re-tested with another FDA cleared, approved, or authorized test, if coninfection would change clinical management. This test was validated by the [facility]. These laboratories are certified under the Clinical Laboratory Improvement Amendments of 1988 (CLIA-88) as qualified to perfom high complexity laboratory testing.   EKG 12-lead, tracing only     Status: None   Result Value Ref Range    Systolic Blood Pressure  mmHg    Diastolic Blood Pressure  mmHg    Ventricular Rate 68 BPM    Atrial Rate 68 BPM    NM Interval 148 ms    QRS Duration 92 ms     ms    QTc 414 ms    P Axis 58 degrees    R AXIS 54 degrees    T Axis 39 degrees    Interpretation ECG       Sinus rhythm  Normal ECG  Unconfirmed report - interpretation of this ECG is computer generated - see medical record for final interpretation    Confirmed by - EMERGENCY ROOM, PHYSICIAN ([id number]),  EDIN BLANDON ([id number]) on 12/26/2024 2:06:35 PM     Results for orders placed or performed in visit on 12/26/24   XR Chest 2 Views     Status: None    Narrative    CHEST TWO VIEWS 12/26/2024 10:36 AM     HISTORY: Fever, unspecified fever cause    COMPARISON: None.       Impression    IMPRESSION: Minimal pleural fluid or pleural thickening on the right.  There are no acute infiltrates. The cardiac silhouette is not  enlarged. Pulmonary vasculature is unremarkable.    MATTHIAS MOSS MD         SYSTEM ID:  [id number]     Medications - No data to display  Labs Ordered and Resulted from Time of ED Arrival to Time of ED Departure   INFLUENZA A/B, RSV AND SARS-COV2  PCR - Normal       Result Value    Influenza A PCR Negative      Influenza B PCR Negative      RSV PCR Negative      SARS CoV2 PCR Negative       POC US CHEST B-SCAN   Final Result   [facility] Procedure Note        Limited Bedside ED Ultrasound of Thorax:      PROCEDURE: PERFORMED BY: Dr. Manoj Beard MD, MD   INDICATIONS/SYMPTOM:  Chest pain   PROBE: High frequency linear probe   BODY LOCATION: Chest   FINDINGS:   Images of both lung hemithoracies taken in 2D in multiple rib spaces          Right side:  Lung sliding artifact  Present      Comet tail artifacts  Present    Left side:  Lung sliding artifact  Present      Comet tail artifacts  Present    Hemothorax: Right side Absent      Left side Absent    Pleural effusion: Right side Absent       Left side Absent      INTERPRETATION: The exam was normal. There was no free fluid identified in the chest cavity. No evidence of pneumothorax, hemothorax or pleural effusion.   IMAGE DOCUMENTATION: Images were archived to PACs system.                    Critical care was not performed.     Medical Decision Making  The patient's presentation was of moderate complexity (an acute illness with systemic symptoms).    The patient's evaluation involved:  ordering and/or review of 2 test(s) in this encounter (see separate area of note for details)    The patient's management necessitated moderate risk (prescription drug management including medications given in the ED).    Assessment & Plan    Left side chest wall pain in the pt with h/o FB to lung s/p resection 44 years ago in [place], noted to have abnormal CXR at [facility]- D/W Pt CT vs US, pt chose US and noted no suggestion of pleural fluids, likely scarring/atelectasis from lung resection her had as a child, likely Chest wall strain of left after strong coughs, will discharge with naproxen prn, follow up with his PMD.    I have reviewed the nursing notes. I have reviewed the findings, diagnosis, plan and need  for follow up with the patient.    Discharge Medication List as of 12/26/2024  2:44 PM        START taking these medications    Details   naproxen (NAPROSYN) 500 MG tablet Take 1 tablet (500 mg) by mouth 2 times daily as needed for moderate pain., Disp-30 tablet, R-0, E-Prescribe             Final diagnoses:   Muscle strain of chest wall, initial encounter   Atelectasis of right lung     Paula MARTIN, am serving as a trained medical scribe to document services personally performed by Manoj Beard MD based on the provider's statements to me on 12/26/24.  This document has been checked and approved by the attending provider.    IManoj MD, was physically present and have reviewed and verified the accuracy of this note documented by Paula Maldonado, medical scribe.      Manoj Beard MD   [facility] EMERGENCY DEPARTMENT  12/26/2024        Manoj Beard MD  12/26/24 [id number]

## 2024-12-26 NOTE — ED TRIAGE NOTES
Reports he began having a cough and dyspnea yesterday and then onset of chest pain this morning he states the L sided chest pressure is aggravated with activity.  Denies hx of pnumonia asthma or CHF, denies sick contacts. Went to urgent care PTA had a CXR that showed                                                                     IMPRESSION: Minimal pleural fluid or pleural thickening on the right.  There are no acute infiltrates. The cardiac silhouette is not  enlarged. Pulmonary vasculature is unremarkable.    He was instructed to come to ED.       Triage Assessment (Adult)       Row Name 12/26/24 2418          Triage Assessment    Airway WDL WDL        Respiratory WDL    Respiratory WDL X;cough        Skin Circulation/Temperature WDL    Skin Circulation/Temperature WDL WDL        Cardiac WDL    Cardiac WDL chest pain;WDL        Cognitive/Neuro/Behavioral WDL    Cognitive/Neuro/Behavioral WDL WDL

## 2025-06-30 ENCOUNTER — PATIENT OUTREACH (OUTPATIENT)
Dept: CARE COORDINATION | Facility: CLINIC | Age: 55
End: 2025-06-30
Payer: COMMERCIAL

## 2025-08-30 ENCOUNTER — HEALTH MAINTENANCE LETTER (OUTPATIENT)
Age: 55
End: 2025-08-30